# Patient Record
Sex: FEMALE | Race: WHITE | NOT HISPANIC OR LATINO | Employment: UNEMPLOYED | URBAN - METROPOLITAN AREA
[De-identification: names, ages, dates, MRNs, and addresses within clinical notes are randomized per-mention and may not be internally consistent; named-entity substitution may affect disease eponyms.]

---

## 2017-01-18 ENCOUNTER — CLINICAL SUPPORT (OUTPATIENT)
Dept: SMOKING CESSATION | Facility: CLINIC | Age: 52
End: 2017-01-18
Payer: COMMERCIAL

## 2017-01-18 DIAGNOSIS — F17.200 NICOTINE DEPENDENCE: Primary | ICD-10-CM

## 2017-01-18 PROCEDURE — 99407 BEHAV CHNG SMOKING > 10 MIN: CPT | Mod: S$GLB,,,

## 2017-08-03 ENCOUNTER — CLINICAL SUPPORT (OUTPATIENT)
Dept: SMOKING CESSATION | Facility: CLINIC | Age: 52
End: 2017-08-03
Payer: COMMERCIAL

## 2017-08-03 DIAGNOSIS — F17.200 NICOTINE DEPENDENCE: Primary | ICD-10-CM

## 2017-08-03 PROCEDURE — 99407 BEHAV CHNG SMOKING > 10 MIN: CPT | Mod: S$GLB,,, | Performed by: INTERNAL MEDICINE

## 2018-05-10 ENCOUNTER — OFFICE VISIT (OUTPATIENT)
Dept: OBSTETRICS AND GYNECOLOGY | Facility: CLINIC | Age: 53
End: 2018-05-10
Payer: OTHER GOVERNMENT

## 2018-05-10 VITALS
HEIGHT: 65 IN | WEIGHT: 200.19 LBS | DIASTOLIC BLOOD PRESSURE: 72 MMHG | BODY MASS INDEX: 33.35 KG/M2 | SYSTOLIC BLOOD PRESSURE: 110 MMHG

## 2018-05-10 DIAGNOSIS — N81.6 RECTOCELE: Primary | ICD-10-CM

## 2018-05-10 DIAGNOSIS — N81.10 BADEN-WALKER GRADE 1 CYSTOCELE: ICD-10-CM

## 2018-05-10 DIAGNOSIS — N39.3 SUI (STRESS URINARY INCONTINENCE, FEMALE): ICD-10-CM

## 2018-05-10 PROCEDURE — 99999 PR PBB SHADOW E&M-EST. PATIENT-LVL III: CPT | Mod: PBBFAC,,, | Performed by: OBSTETRICS & GYNECOLOGY

## 2018-05-10 PROCEDURE — 99213 OFFICE O/P EST LOW 20 MIN: CPT | Mod: PBBFAC | Performed by: OBSTETRICS & GYNECOLOGY

## 2018-05-10 PROCEDURE — 99213 OFFICE O/P EST LOW 20 MIN: CPT | Mod: S$PBB,,, | Performed by: OBSTETRICS & GYNECOLOGY

## 2018-05-10 NOTE — PROGRESS NOTES
Subjective:       Patient ID: Yeimi Watson is a 52 y.o. female.    Chief Complaint:  Pelvic Discomfort      History of Present Illness  HPI  Presents with rectal prolapse.  The patient has been seeing an outside urologist for difficulty voiding.  He examined her and did not appreciate a significant cystocele, but did note a rectocele.  He did a cystoscopy, and removed some bladder polyps, and sent her to pelvic floor PT for her urinary issues.  Patient has not noticed much of a difference.  She does complain of a bulge at the introitus, and states it is embarrassing for her and prevents her from having sex with her .  She does note occasional urinary leak with cough or sneeze.  Of note, the patient has had significant weight gain since she quit smoking 2 years ago, and is considering a gastric sleeve this summer to help her acheive weight loss.  She has a hx of a TVH for uterine prolapse.  Still has her ovaries.    GYN & OB History  No LMP recorded. Patient has had a hysterectomy.   Date of Last Pap: No result found    OB History    Para Term  AB Living   3 3           SAB TAB Ectopic Multiple Live Births                  # Outcome Date GA Lbr Tommy/2nd Weight Sex Delivery Anes PTL Lv   3 Para            2 Para            1 Para                   Review of Systems  Review of Systems   Constitutional: Negative for fatigue, fever and unexpected weight change.   Gastrointestinal: Positive for constipation (has chronic constipation mitigated by a daily OTC stool softener). Negative for abdominal pain, diarrhea, nausea and vomiting.   Genitourinary: Positive for urinary incontinence (see HPI). Negative for dyspareunia, dysuria, frequency, pelvic pain, urgency, vaginal bleeding, vaginal discharge, vaginal pain, postcoital bleeding and vaginal odor.           Objective:    Physical Exam:   Constitutional: She is oriented to person, place, and time. She appears well-developed and well-nourished. No  distress.             Abdominal: Soft. She exhibits no distension and no mass. There is no tenderness. There is no rebound and no guarding. Hernia confirmed negative in the right inguinal area and confirmed negative in the left inguinal area.     Genitourinary: Vagina normal. Pelvic exam was performed with patient supine. There is no rash, tenderness, lesion or injury on the right labia. There is no rash, tenderness, lesion or injury on the left labia. Uterus is absent. Right adnexum displays no mass, no tenderness and no fullness. Left adnexum displays no mass, no tenderness and no fullness. There is rectocele (grade 2 with Valsalva and perineal relaxation) and cystocele (mild grade 1 with Valsalva; no urinary leak on cough stress test) in the vagina. No erythema, tenderness, bleeding or unspecified prolapse of vaginal walls (Vaginal cuff does not descend at all.  It is very well-supported) in the vagina. No foreign body in the vagina. No signs of injury around the vagina. No vaginal discharge found. Cervix exhibits absence.               Neurological: She is alert and oriented to person, place, and time.     Psychiatric: She has a normal mood and affect.          Assessment:        1. Rectocele    2. Columbus-Walker grade 1 cystocele    3. ALTON (stress urinary incontinence, female)                Plan:      Yeimi was seen today for pelvic discomfort.    Diagnoses and all orders for this visit:    Rectocele    Columb[place]-Walker grade 1 cystocele    ALTON (stress urinary incontinence, female)    Reviewed pessary versus surgery.  Patient is interested in surgical management.  Has tried pelvic floor PT for her urinary issues with minimal improvement.  Since she is planning weight loss surgery this summer, I advised her to wait for pelvic floor repairs until she has achieved some weight loss.  Weight loss may help resolve her mild stress incontinence.  She will f/u with me 6 months following weight loss surgery, and we can  reassess her symptoms and plan appropriate surgical management.

## 2018-05-10 NOTE — PATIENT INSTRUCTIONS
Pelvic Organ Prolapse  Pelvic organ prolapse is when 1 or more of the organs inside the pelvis (found between the waist and thighs), slip from their normal positions. Normally, muscles and tissues in the pelvic region support the pelvic organs and hold them in place.  What is a normal pelvis?     Cutaway view of pelvis showing the small intesting, bladder, pubic bone, urethra, pelvic floor muscles, uterus, vagina, and rectum.   A. The small intestine absorbs nutrients from food.  B. The bladder collects and holds urine.  C. The pubic bone helps protect the pelvic organs.  D. The urethra is the tube that carries urine out of the body.  E. The pelvic floor muscles support organs and other structures in the pelvis.  F. The uterus is where the baby develops when a women is pregnant.  G. The vagina is the canal from the uterus to the outside of the body.  H. The rectum stores stool until a bowel movement occurs.  What causes pelvic organ prolapse?   There are several causes of pelvic organ prolapse including:  · Vaginal childbirth  · Genetic predisposition  · Connective tissue disorders  · Advancing age  · Constant coughing (such as with bronchitis or smoking)  · Heavy lifting  · Chronic straining (such as with constipation)  · Being overweight  What are the symptoms of pelvic organ prolapse?  The symptoms of pelvic organ prolapse include:  · A feeling of fullness or pressure in your pelvis  · A sense that a ball or lump is protruding from the vagina  · Problems passing urine or having a bowel movement  · Urine leakage when you cough or use stairs. (But this can happen even without prolapse.)   · Pain or pressure in your low back  · Problems with sexual intercourse  Date Last Reviewed: 5/10/2015  © 0300-9904 [employer]. [address] [place] [address], [place] [address]. All rights reserved. This information is not intended as a substitute for professional medical care. Always follow your healthcare  professional's instructions.        Pelvic Organ Prolapse: Surgery for Cystocele  Cystocele occurs when the bladder prolapses (sags) into the vagina. The goal of surgery is to repair the problem. This will help relieve your symptoms. Your surgery may include 1 or more repairs.     Cystocele         Anterior Repair         Incision made in the vaginal wall       Abdominal incisions      The surgical procedure  Cystocele can be treated with an anterior repair. This type of surgery is done through the vagina. The sagging bladder is moved back into its normal position. Sutures (stitches) are placed in tissue between the bladder and the vagina. This helps hold the bladder in place. In some cases, another type of surgery is done. It can help correct weakness in the front wall of the vagina. The vagina is attached to strong tissues in the side wall of the pelvis.  Your incisions  During surgery, the doctor will reach your pelvic organs through the vagina or the abdomen. An incision may be made in the vaginal wall. Surgery through the abdomen may be done with a single incision made up and down (vertically) or across (transverse), or through several small incisions (called laparoscopy).  Possible risks and complications of this surgery  · Infection  · Bleeding  · Risks of anesthesia  · Damage to nerves, muscles, or nearby pelvic structures  · Blood clots  · Prolapse of the pelvic organ or organs occurring again   Date Last Reviewed: 5/10/2015  © 1863-1397 [employer]. [address] [place] [address], [place] [address]. All rights reserved. This information is not intended as a substitute for professional medical care. Always follow your healthcare professional's instructions.        Pelvic Organ Prolapse: Surgery for Rectocele and Enterocele  The organs in the pelvis are supported by structures around them. Things like aging and childbirth can cause these structures to weaken. Loss of support lets pelvic organs fall out  of their normal position. This is called prolapse. If the rectum falls out of place and bulges into the vagina, it is called rectocele. If the small intestine falls out of place and bulges into the vagina, it is called enterocele. Surgery can be done to fix these problems. This will help relieve your symptoms.           Posterior repair         Incision made in vaginal wall       Abdominal incisions      The surgical procedure  · To correct a rectocele, the rectum is moved back to its normal position. The tissue between the vagina and rectum is sutured (stitched) to strengthen it. This stops the rectum from bulging into the vagina.  · To correct an enterocele, the small intestine is moved away from the vagina. Excess tissue is then sutured. This holds it in place.  Your incisions  During surgery, the doctor reaches your pelvic organs through the vagina or the abdomen. If going through the vagina, incisions may be made in the wall of the vagina. If the surgery is through the abdomen, several small incisions may be used to perform laparoscopic surgery, or one larger incision either up and down (vertical) or across (across) may be used.  Possible risks and complications of prolapse surgery  · Infection  · Bleeding  · Risks of anesthesia  · Damage to nerves, muscles, or nearby pelvic structures  · Blood clots  · Prolapse of the pelvic organ or organs occurring again   Date Last Reviewed: 5/10/2015  © 6163-5918 [employer]. [address] [place] [address], [place] [address]. All rights reserved. This information is not intended as a substitute for professional medical care. Always follow your healthcare professional's instructions.        Pelvic Organ Prolapse: Surgery for Incontinence  The pelvic organs include the organs of your reproductive system and your urinary tract. The pelvic organs are supported by pelvic floor muscles. Pelvic floor muscles can weaken. This may cause one or more pelvic organs to fall out  of place (prolapse). Pelvic organ prolapse can contribute to stress urinary incontinence (ALTON). This is trouble controlling the flow of urine out of the body. You may have surgery to treat ALTON. During this surgery, a prolapse can be fixed. One or more extra incisions may be needed to do this. Your surgeon can discuss the details with you.  Pelvic organ prolapse: common types     Cystocele.This is when the bladder sags into the vagina. To fix this, the bladder is moved back into its normal position. It is then sewn into place. The tissue between the vagina and bladder may be strengthened for better support to keep the bladder from sagging.     Uterine prolapse.This is when the uterus sags into the vagina. The uterus may fall as far as the opening of the vagina. To fix this, the uterus is often removed (hysterectomy). Or, the uterus may be sewn back into place.      Rectocele. This is when the rectum bulges into the vagina. An enterocele (much less common) is when the small intestine bulges into the vagina. During ALTON surgery, the bulge in the rectum or small intestine can be fixed.    Vaginal vault prolapse. This is when the walls of the vagina fall in on themselves. It can happen if the uterus has been removed. Surgery can be done to lift the vagina and hold it in place.   Risks and possible complications of this surgery  · Infection  · Bleeding  · Blood clots  · Risks of anesthesia  · Damage to nerves, muscles, or nearby pelvic structures  · Prolapse of the pelvic organ or organs happening again   Date Last Reviewed: 7/1/2016  © 6330-1565 [employer]. [address] [place] [address], [place] [address]. All rights reserved. This information is not intended as a substitute for professional medical care. Always follow your healthcare professional's instructions.

## 2018-05-29 ENCOUNTER — TELEPHONE (OUTPATIENT)
Dept: PULMONOLOGY | Facility: CLINIC | Age: 53
End: 2018-05-29

## 2018-05-29 DIAGNOSIS — J44.9 CHRONIC OBSTRUCTIVE PULMONARY DISEASE, UNSPECIFIED COPD TYPE: Primary | ICD-10-CM

## 2018-05-30 ENCOUNTER — PROCEDURE VISIT (OUTPATIENT)
Dept: PULMONOLOGY | Facility: CLINIC | Age: 53
End: 2018-05-30
Payer: OTHER GOVERNMENT

## 2018-05-30 DIAGNOSIS — J44.9 CHRONIC OBSTRUCTIVE PULMONARY DISEASE, UNSPECIFIED COPD TYPE: ICD-10-CM

## 2018-05-30 LAB
BRPFT: ABNORMAL
FEF 25 75 CHG: 13.6 %
FEF 25 75 LLN: 1.52
FEF 25 75 POST REF: 50.1 %
FEF 25 75 POST: 1.36 L/S (ref 1.52–3.91)
FEF 25 75 PRE REF: 44.1 %
FEF 25 75 PRE: 1.2 L/S (ref 1.52–3.91)
FEF 25 75 REF: 2.72
FET100 CHG: -8.4 %
FET100 POST: 12.66 SEC
FET100 PRE: 13.82 SEC
FEV1 CHG: 7.8 %
FEV1 FVC CHG: 3.3 %
FEV1 FVC LLN: 69
FEV1 FVC POST REF: 83.9 %
FEV1 FVC POST: 67.54 % (ref 69.28–91.63)
FEV1 FVC PRE REF: 81.3 %
FEV1 FVC PRE: 65.41 % (ref 69.28–91.63)
FEV1 FVC REF: 80
FEV1 LLN: 2.16
FEV1 POST REF: 87.6 %
FEV1 POST: 2.43 L (ref 2.16–3.38)
FEV1 PRE REF: 81.2 %
FEV1 PRE: 2.25 L (ref 2.16–3.38)
FEV1 REF: 2.77
FEV6 CHG: 6.1 %
FEV6 LLN: 2.78
FEV6 POST REF: 96.8 %
FEV6 POST: 3.35 L (ref 2.78–4.15)
FEV6 PRE REF: 91.3 %
FEV6 PRE: 3.16 L (ref 2.78–4.15)
FEV6 REF: 3.47
FVC CHG: 4.4 %
FVC LLN: 2.71
FVC POST REF: 103.8 %
FVC POST: 3.59 L (ref 2.71–4.22)
FVC PRE REF: 99.4 %
FVC PRE: 3.44 L (ref 2.71–4.22)
FVC REF: 3.46
MVV LLN: 88
MVV REF: 104
PEF CHG: 20.1 %
PEF LLN: 5.04
PEF POST REF: 93.3 %
PEF POST: 6.31 L/S (ref 5.04–8.48)
PEF PRE REF: 77.7 %
PEF PRE: 5.25 L/S (ref 5.04–8.48)
PEF REF: 6.76

## 2018-05-30 PROCEDURE — 94060 EVALUATION OF WHEEZING: CPT | Mod: 26,S$PBB,, | Performed by: INTERNAL MEDICINE

## 2018-05-30 PROCEDURE — 94060 EVALUATION OF WHEEZING: CPT | Mod: PBBFAC,PO

## 2018-05-31 ENCOUNTER — HOSPITAL ENCOUNTER (OUTPATIENT)
Dept: RADIOLOGY | Facility: HOSPITAL | Age: 53
Discharge: HOME | End: 2018-05-31
Attending: INTERNAL MEDICINE
Payer: OTHER GOVERNMENT

## 2018-05-31 ENCOUNTER — OFFICE VISIT (OUTPATIENT)
Dept: PULMONOLOGY | Facility: CLINIC | Age: 53
End: 2018-05-31
Payer: OTHER GOVERNMENT

## 2018-05-31 VITALS
WEIGHT: 195.31 LBS | SYSTOLIC BLOOD PRESSURE: 118 MMHG | BODY MASS INDEX: 32.54 KG/M2 | HEIGHT: 65 IN | RESPIRATION RATE: 18 BRPM | DIASTOLIC BLOOD PRESSURE: 76 MMHG | OXYGEN SATURATION: 98 % | HEART RATE: 80 BPM

## 2018-05-31 DIAGNOSIS — J44.9 CHRONIC OBSTRUCTIVE PULMONARY DISEASE, UNSPECIFIED COPD TYPE: ICD-10-CM

## 2018-05-31 DIAGNOSIS — F17.200 SMOKING: ICD-10-CM

## 2018-05-31 DIAGNOSIS — J30.89 SEASONAL ALLERGIC RHINITIS DUE TO OTHER ALLERGIC TRIGGER: ICD-10-CM

## 2018-05-31 DIAGNOSIS — J40 BRONCHITIS: ICD-10-CM

## 2018-05-31 DIAGNOSIS — J44.9 CHRONIC OBSTRUCTIVE PULMONARY DISEASE, UNSPECIFIED COPD TYPE: Primary | ICD-10-CM

## 2018-05-31 PROCEDURE — 99999 PR PBB SHADOW E&M-EST. PATIENT-LVL III: CPT | Mod: PBBFAC,,, | Performed by: INTERNAL MEDICINE

## 2018-05-31 PROCEDURE — 71046 X-RAY EXAM CHEST 2 VIEWS: CPT | Mod: 26,,, | Performed by: RADIOLOGY

## 2018-05-31 PROCEDURE — 99215 OFFICE O/P EST HI 40 MIN: CPT | Mod: 25,S$PBB,, | Performed by: INTERNAL MEDICINE

## 2018-05-31 PROCEDURE — 71046 X-RAY EXAM CHEST 2 VIEWS: CPT | Mod: TC,FY,PO

## 2018-05-31 PROCEDURE — 99213 OFFICE O/P EST LOW 20 MIN: CPT | Mod: PBBFAC,25,PO | Performed by: INTERNAL MEDICINE

## 2018-05-31 RX ORDER — FLUTICASONE PROPIONATE 50 MCG
2 SPRAY, SUSPENSION (ML) NASAL DAILY
Qty: 1 BOTTLE | Refills: 11 | Status: SHIPPED | OUTPATIENT
Start: 2018-05-31 | End: 2019-05-31

## 2018-05-31 RX ORDER — ALBUTEROL SULFATE 90 UG/1
2 AEROSOL, METERED RESPIRATORY (INHALATION) EVERY 6 HOURS
Qty: 1 INHALER | Refills: 12 | Status: SHIPPED | OUTPATIENT
Start: 2018-05-31 | End: 2019-05-31

## 2018-05-31 RX ORDER — FLUTICASONE PROPIONATE AND SALMETEROL XINAFOATE 115; 21 UG/1; UG/1
2 AEROSOL, METERED RESPIRATORY (INHALATION) 2 TIMES DAILY
Qty: 1 INHALER | Refills: 11 | Status: SHIPPED | OUTPATIENT
Start: 2018-05-31 | End: 2019-05-31

## 2018-05-31 RX ORDER — AZITHROMYCIN 250 MG/1
250 TABLET, FILM COATED ORAL DAILY
Qty: 6 TABLET | Refills: 1 | Status: SHIPPED | OUTPATIENT
Start: 2018-05-31 | End: 2018-12-06

## 2018-05-31 NOTE — PATIENT INSTRUCTIONS
Fluticasone; Salmeterol inhalation aerosol  What is this medicine?  FLUTICASONE; SALMETEROL (floo TIK a sone; sal ME te role) inhalation is a combination of two medicines that decrease inflammation and help to open up the airways of your lungs. It is used to treat asthma. Do NOT use for an acute asthma attack.  How should I use this medicine?  This medicine is inhaled through the mouth. Follow the directions on the prescription label. Shake well for 5 seconds before each use. After using the inhaler, rinse your mouth with water. Make sure not to swallow the water. Take your medicine at regular intervals. Do not take your medicine more often than directed. Do not stop taking except on your doctor's advice. Make sure that you are using your inhaler correctly. Ask you doctor or health care provider if you have any questions.  A special MedGuide will be given to you by the pharmacist with each prescription and refill. Be sure to read this information carefully each time.  Talk to your pediatrician regarding the use of this medicine in children. While this drug may be prescribed for children as young as 12 years of age for selected conditions, precautions do apply.  What side effects may I notice from receiving this medicine?  Side effects that you should report to your doctor or health care professional as soon as possible:  · allergic reactions like skin rash or hives, swelling of the face, lips, or tongue  · changes in vision  · chest pain  · feeling faint or lightheaded, falls  · fever or chills  · irregular heartbeat  Side effects that usually do not require medical attention (report to your doctor or health care professional if they continue or are bothersome):  · coughing, hoarseness, throat irritation  · headache  · nervousness  · stomach problems  · stuffy nose  · tremors  What may interact with this medicine?  Do not take this medicine with any of the following medications:  · MAOIs like Carbex, Eldepryl,  Marplan, Nardil, and Parnate  This medicine may also interact with the following medications:  · aminophylline or theophylline  · antiviral medicines for HIV or AIDS  · diuretics  · medicines for colds  · medicines for depression or emotional conditions  · medicines for fungal infections like ketoconazole and itraconazole  · medicines for the heart like metoprolol, propanolol  · medicines for weight loss including some herbal products  · other medicine for breathing problems  · pimozide  · some antibiotics like clarithromycin, erythromycin, levofloxacin, linezolid, and telithromycin  · vaccines  What if I miss a dose?  If you miss a dose, use it as soon as you remember. If it is almost time for your next dose, use only that dose and continue with your regular schedule, spacing doses evenly. Do not use double or extra doses.  Where should I keep my medicine?  Keep out of the reach of children.  Store at room temperature between 15 and 30 degrees C (59 and 86 degrees F). Store inhaler with the mouthpiece down. Keep track of the number of doses used. Throw away the inhaler after 120 inhalations or after the expiration date, whichever comes first.  What should I tell my health care provider before I take this medicine?  They need to know if you have any of these conditions:  · bone problems  · immune system problems  · diabetes  · heart disease or irregular heartbeat  · high blood pressure  · infection  · pheochromocytoma  · seizures  · thyroid disease  · worsening asthma  · an unusual or allergic reaction to fluticasone; salmeterol, other corticosteroids, other medicines, foods, dyes, or preservatives  · pregnant or trying to get pregnant  · breast-feeding  What should I watch for while using this medicine?  Visit your doctor for regular check ups. Tell your doctor or health care professional if your symptoms do not get better. If your symptoms get worse or if you need your short-acting inhalers more often, call your  doctor right away. Do not use this medicine more than every 12 hours.  If you have asthma, be aware that using this medicine may increase your risk of dying from asthma-related problems. Talk to your doctor about the risks and benefits of taking this medicine. NEVER use this medicine for an acute asthma attack.  This medicine may increase your risk of getting an infection. Tell your doctor or health care professional if you are around anyone with measles or chickenpox, or if you develop sores or blisters that do not heal properly.  NOTE:This sheet is a summary. It may not cover all possible information. If you have questions about this medicine, talk to your doctor, pharmacist, or health care provider. Copyright© 2017 [employer]        Albuterol inhalation aerosol  What is this medicine?  ALBUTEROL (al BYOO ter ole) is a bronchodilator. It helps open up the airways in your lungs to make it easier to breathe. This medicine is used to treat and to prevent bronchospasm.  How should I use this medicine?  This medicine is for inhalation through the mouth. Follow the directions on your prescription label. Take your medicine at regular intervals. Do not use more often than directed. Make sure that you are using your inhaler correctly. Ask you doctor or health care provider if you have any questions.  Talk to your pediatrician regarding the use of this medicine in children. Special care may be needed.  What side effects may I notice from receiving this medicine?  Side effects that you should report to your doctor or health care professional as soon as possible:  · allergic reactions like skin rash, itching or hives, swelling of the face, lips, or tongue  · breathing problems  · chest pain  · feeling faint or lightheaded, falls  · high blood pressure  · irregular heartbeat  · fever  · muscle cramps or weakness  · pain, tingling, numbness in the hands or feet  · vomiting  Side effects that usually do not require medical  attention (report to your doctor or health care professional if they continue or are bothersome):  · cough  · difficulty sleeping  · headache  · nervousness or trembling  · stomach upset  · stuffy or runny nose  · throat irritation  · unusual taste  What may interact with this medicine?  · anti-infectives like chloroquine and pentamidine  · caffeine  · cisapride  · diuretics  · medicines for colds  · medicines for depression or for emotional or psychotic conditions  · medicines for weight loss including some herbal products  · methadone  · some antibiotics like clarithromycin, erythromycin, levofloxacin, and linezolid  · some heart medicines  · steroid hormones like dexamethasone, cortisone, hydrocortisone  · theophylline  · thyroid hormones  What if I miss a dose?  If you miss a dose, use it as soon as you can. If it is almost time for your next dose, use only that dose. Do not use double or extra doses.  Where should I keep my medicine?  Keep out of the reach of children.  Store at room temperature between 15 and 30 degrees C (59 and 86 degrees F). The contents are under pressure and may burst when exposed to heat or flame. Do not freeze. This medicine does not work as well if it is too cold. Throw away any unused medicine after the expiration date. Inhalers need to be thrown away after the labeled number of puffs have been used or by the expiration date; whichever comes first. Ventolin HFA should be thrown away 12 months after removing from foil pouch. Check the instructions that come with your medicine.  What should I tell my health care provider before I take this medicine?  They need to know if you have any of the following conditions:  · diabetes  · heart disease or irregular heartbeat  · high blood pressure  · pheochromocytoma  · seizures  · thyroid disease  · an unusual or allergic reaction to albuterol, levalbuterol, sulfites, other medicines, foods, dyes, or preservatives  · pregnant or trying to get  "pregnant  · breast-feeding  What should I watch for while using this medicine?  Tell your doctor or health care professional if your symptoms do not improve. Do not use extra albuterol. If your asthma or bronchitis gets worse while you are using this medicine, call your doctor right away.  If your mouth gets dry try chewing sugarless gum or sucking hard candy. Drink water as directed.  NOTE:This sheet is a summary. It may not cover all possible information. If you have questions about this medicine, talk to your doctor, pharmacist, or health care provider. Copyright© 2017 [employer]        Using an Inhaler  Your healthcare provider may prescribe medicine that you breathe in using a metered-dose inhaler (MDI). An inhaler sends a measured amount of medicine in a fine mist.  Step 1:  · Shake the inhaler and remove the cap.  · Take a deep breath and let it out.  Step 2:  · Close your lips around the end of the inhaler mouthpiece. Or if you were told to use the "open-mouth" method, hold the inhaler 1 to 2 inches from your mouth.  Step 3:  · Breathe in slowly and deeply as you press down on the inhaler to release the medicine.  · Inhale fully.  Step 4:  · Hold your breath for a count of 10, or as long as you can comfortably.  · Then breathe out slowly through your mouth.  · Repeat these steps for each puff of medicine prescribed.             Important  · If the inhaler is being used for the first time or has not been used for a while, prime it as directed by the product maker. You can find important information about the medicine in the package insert. This is the paper that comes with the medicine.  · If you use more than one inhaler, make sure you know which one to use first.  · Your healthcare provider or pharmacist can show you how to use your inhaler the right way. Even if you think you are using it the right way, it is still a good idea to check.   Date Last Reviewed: 10/1/2016  © 1795-9669 [employer], " [employer]. [address] [place] [address]. All rights reserved. This information is not intended as a substitute for professional medical care. Always follow your healthcare professional's instructions.        Step-by-Step  Using an Inhaler with a Spacer    Date Last Reviewed: 2/1/2017  © 2146-1569 [employer]. [address] [place] [address]. All rights reserved. This information is not intended as a substitute for professional medical care. Always follow your healthcare professional's instructions.        Fluticasone nasal spray  What is this medicine?  FLUTICASONE (floo TIK a sone) is a corticosteroid. This medicine is used to treat the symptoms of allergies like sneezing, itchy red eyes, and itchy, runny, or stuffy nose.  How should I use this medicine?  This medicine is for use in the nose. Follow the directions on your product or prescription label. This medicine works best if used at regular intervals. Do not use more often than directed. Make sure that you are using your nasal spray correctly. After 6 months of daily use without a prescription, talk to your doctor or health care professional before using it for a longer time. Ask your doctor or health care professional if you have any questions.  Talk to your pediatrician regarding the use of this medicine in children. Special care may be needed. This medicine has been used in children as young as 2 years. After two months of daily use without a prescription in a child, talk to your pediatrician before using it for a longer time.  What side effects may I notice from receiving this medicine?  Side effects that you should report to your doctor or health care professional as soon as possible:  · allergic reactions like skin rash, itching or hives, swelling of the face, lips, or tongue  · changes in vision  · flu-like symptoms  · white patches or sores in the mouth or nose  Side effects that usually do not require medical attention  (report to your doctor or health care professional if they continue or are bothersome):  · burning or irritation inside the nose or throat  · cough  · headache  · nosebleed  · unusual taste or smell  What may interact with this medicine?  · ketoconazole  · metyrapone  · some medicines for HIV  · vaccines  What if I miss a dose?  If you miss a dose, use it as soon as you remember. If it is almost time for your next dose, use only that dose and continue with your regular schedule. Do not use double or extra doses.  Where should I keep my medicine?  Keep out of the reach of children.  Store at room temperature between 15 and 30 degrees C (59 and 86 degrees F). Throw away any unused medicine after the expiration date.  What should I tell my health care provider before I take this medicine?  They need to know if you have any of these conditions:  · infection, like tuberculosis, herpes, or fungal infection  · recent surgery on nose or sinuses  · taking corticosteroid by mouth  · an unusual or allergic reaction to fluticasone, steroids, other medicines, foods, dyes, or preservatives  · pregnant or trying to get pregnant  · breast-feeding  What should I watch for while using this medicine?  Visit your doctor or health care professional for regular checks on your progress. Some symptoms may improve within 12 hours after starting use. Check with your doctor or health care professional if there is no improvement in your condition after 3 weeks of use.  Do not come in contact with people who have chickenpox or the measles while you are taking this medicine. If you do, call your doctor right away.  NOTE:This sheet is a summary. It may not cover all possible information. If you have questions about this medicine, talk to your doctor, pharmacist, or health care provider. Copyright© 2017 [employer]        Azithromycin tablets  What is this medicine?  AZITHROMYCIN (az ith ashwini MYE sin) is a macrolide antibiotic. It is used to  treat or prevent certain kinds of bacterial infections. It will not work for colds, flu, or other viral infections.  How should I use this medicine?  Take this medicine by mouth with a full glass of water. Follow the directions on the prescription label. The tablets can be taken with food or on an empty stomach. If the medicine upsets your stomach, take it with food. Take your medicine at regular intervals. Do not take your medicine more often than directed. Take all of your medicine as directed even if you think your are better. Do not skip doses or stop your medicine early. Talk to your pediatrician regarding the use of this medicine in children. Special care may be needed.  What side effects may I notice from receiving this medicine?  Side effects that you should report to your doctor or health care professional as soon as possible:  · allergic reactions like skin rash, itching or hives, swelling of the face, lips, or tongue  · confusion, nightmares or hallucinations  · dark urine  · difficulty breathing  · hearing loss  · irregular heartbeat or chest pain  · pain or difficulty passing urine  · redness, blistering, peeling or loosening of the skin, including inside the mouth  · white patches or sores in the mouth  · yellowing of the eyes or skin  Side effects that usually do not require medical attention (report to your doctor or health care professional if they continue or are bothersome):  · diarrhea  · dizziness, drowsiness  · headache  · stomach upset or vomiting  · tooth discoloration  · vaginal irritation  What may interact with this medicine?  Do not take this medicine with any of the following medications:  · lincomycin  This medicine may also interact with the following medications:  · amiodarone  · antacids  · birth control pills  · cyclosporine  · digoxin  · magnesium  · nelfinavir  · phenytoin  · warfarin  What if I miss a dose?  If you miss a dose, take it as soon as you can. If it is almost time for  your next dose, take only that dose. Do not take double or extra doses.  Where should I keep my medicine?  Keep out of the reach of children.  Store at room temperature between 15 and 30 degrees C (59 and 86 degrees F). Throw away any unused medicine after the expiration date.  What should I tell my health care provider before I take this medicine?  They need to know if you have any of these conditions:  · kidney disease  · liver disease  · irregular heartbeat or heart disease  · an unusual or allergic reaction to azithromycin, erythromycin, other macrolide antibiotics, foods, dyes, or preservatives  · pregnant or trying to get pregnant  · breast-feeding  What should I watch for while using this medicine?  Tell your doctor or health care professional if your symptoms do not improve.  Do not treat diarrhea with over the counter products. Contact your doctor if you have diarrhea that lasts more than 2 days or if it is severe and watery.  This medicine can make you more sensitive to the sun. Keep out of the sun. If you cannot avoid being in the sun, wear protective clothing and use sunscreen. Do not use sun lamps or tanning beds/booths.  NOTE:This sheet is a summary. It may not cover all possible information. If you have questions about this medicine, talk to your doctor, pharmacist, or health care provider. Copyright© 2017 [employer]

## 2018-05-31 NOTE — PROGRESS NOTES
Subjective:       Patient ID: Yeimi Watson is a 52 y.o. female.    Chief Complaint: She       COPD    HPI     COPD  She presents for evaluation and treatment of COPD. The patient is currently having symptoms / an exacerbation. Current symptoms include chronic dyspnea, dyspnea after 3 flights of stairs and non-productive cough. Symptoms have been present since several years ago and have been gradually worsening. She denies dyspnea, wheezing and fatigue. Associated symptoms include poor exercise tolerance.  This episode appears to have been triggered by heat. Treatments tried for the current exacerbation: none. The patient has been having similar episodes for approximately 3 years. She uses 1 pillows at night. Patient currently is not on home oxygen therapy.. The patient is having no constitutional symptoms, denying fever, chills, anorexia, or weight loss. The patient has not been hospitalized for this condition before. She quit smoking approximately 2 years ago. The patient is experiencing exercise intolerance (difficulty climbing 3 flights of stairs).      Past Medical History:   Diagnosis Date    Chest pain syndrome 3/11/2016    Emphysema     early     Past Surgical History:   Procedure Laterality Date    BREAST LUMPECTOMY Left     left breast, benign    HYSTERECTOMY      TVH for prolapse     Social History     Social History    Marital status:      Spouse name: N/A    Number of children: N/A    Years of education: N/A     Occupational History    Not on file.     Social History Main Topics    Smoking status: Former Smoker     Packs/day: 1.00     Years: 32.00     Quit date: 4/28/2016    Smokeless tobacco: Not on file    Alcohol use No    Drug use: No    Sexual activity: Yes     Other Topics Concern    Not on file     Social History Narrative    No narrative on file     Review of Systems   Constitutional: Positive for fatigue. Negative for fever.   HENT: Positive for postnasal drip,  rhinorrhea and congestion.    Eyes: Negative for redness and itching.   Respiratory: Positive for cough, sputum production, shortness of breath, dyspnea on extertion, use of rescue inhaler and Paroxysmal Nocturnal Dyspnea.    Cardiovascular: Negative for chest pain, palpitations and leg swelling.   Genitourinary: Negative for difficulty urinating and hematuria.   Endocrine: Negative for cold intolerance and heat intolerance.    Skin: Negative for rash.   Gastrointestinal: Negative for nausea and abdominal pain.   Neurological: Negative for dizziness, syncope, weakness and light-headedness.   Hematological: Negative for adenopathy. Does not bruise/bleed easily.   Psychiatric/Behavioral: Negative for sleep disturbance. The patient is not nervous/anxious.        Objective:      Physical Exam   Constitutional: She is oriented to person, place, and time. She appears well-developed and well-nourished.   HENT:   Head: Normocephalic and atraumatic.   Mouth/Throat: Oropharyngeal exudate present.   Eyes: Conjunctivae are normal. Pupils are equal, round, and reactive to light.   Neck: Neck supple. No JVD present. No tracheal deviation present. No thyromegaly present.   Cardiovascular: Normal rate, regular rhythm and normal heart sounds.    Pulmonary/Chest: Effort normal. No respiratory distress. She has decreased breath sounds. She has wheezes in the right lower field and the left lower field. She has no rhonchi. She has no rales. She exhibits no tenderness.   Abdominal: Soft. Bowel sounds are normal.   Musculoskeletal: Normal range of motion. She exhibits no edema.   Lymphadenopathy:     She has no cervical adenopathy.   Neurological: She is alert and oriented to person, place, and time.   Skin: Skin is warm and dry.   Nursing note and vitals reviewed.    Personal Diagnostic Review  Chest x-ray: hyperinflation  Radiology Result      Name:   :  Patient MRN:   Yeimi Watson [id number]   Account Number: Room & Bed  Accession Number:   [id number]   [id number]   Authorizing Physician: Patient Class: Diagnosis:   Jose David Curran OP- Outpatient Diagnostic Testing Chronic obstructive pulmonary disease, unspecified COPD type [J44.9 (ICD-10-CM)]   Procedure:  Exam Date: Reason for Exam:   X-Ray Chest PA And Lateral 05/31/2018 None Specified             RESULTS:  EXAMINATION:  XR CHEST PA AND LATERAL     CLINICAL HISTORY:  Chronic obstructive pulmonary disease, unspecified     TECHNIQUE:  PA and lateral views of the chest were performed.     COMPARISON:  March 8, 2016.     FINDINGS:  Heart size is normal.  The lungs are hyperexpanded.  No confluent infiltrate or effusion.  No acute osseous abnormality.     IMPRESSION:      As above.        Electronically signed by: CHOCO Warren MD  Date:                                            05/31/2018  Time:                                           09:27                      Office Spirometry Results: normal today     No flowsheet data found.  Pulmonary Studies Review 5/31/2018   SpO2 98   Height 65.000   Weight 3125.24   BMI (Calculated) 32.6   Predicted Distance 383.08   Predicted Distance Meters (Calculated) 523.47         Assessment:       1. Chronic obstructive pulmonary disease, unspecified COPD type    2. Seasonal allergic rhinitis due to other allergic trigger    3. Bronchitis        Outpatient Encounter Prescriptions as of 5/31/2018   Medication Sig Dispense Refill    albuterol 90 mcg/actuation inhaler Inhale 2 puffs into the lungs every 6 (six) hours. 1 Inhaler 12    azithromycin (ZITHROMAX Z-SOTERO) 250 MG tablet Take 1 tablet (250 mg total) by mouth once daily. 500 mg on day 1 (two tablets) followed by 250 mg once daily on days 2-5 6 tablet 1    fluticasone (FLONASE) 50 mcg/actuation nasal spray 2 sprays (100 mcg total) by Each Nare route once daily. 1 Bottle 11    fluticasone-salmeterol (ADVAIR HFA) 115-21 mcg/actuation HFAA Inhale 2 puffs into the lungs 2 (two) times daily.  Controller 1 Inhaler 11    predniSONE (DELTASONE) 20 MG tablet Prednisone 60 mg/ day for 3 days, 40 mg/day for 3 days,20 mg/ day for 3 days, (1/2 tablet )10 mg a day for 3 days. 20 tablet 1     No facility-administered encounter medications on file as of 2018.      Orders Placed This Encounter   Procedures    Six Minute Walk Test to qualify for Home Oxygen     Standing Status:   Future     Standing Expiration Date:   2019     Plan:       Requested Prescriptions     Signed Prescriptions Disp Refills    fluticasone (FLONASE) 50 mcg/actuation nasal spray 1 Bottle 11     Si sprays (100 mcg total) by Each Nare route once daily.    albuterol 90 mcg/actuation inhaler 1 Inhaler 12     Sig: Inhale 2 puffs into the lungs every 6 (six) hours.    fluticasone-salmeterol (ADVAIR HFA) 115-21 mcg/actuation HFAA 1 Inhaler 11     Sig: Inhale 2 puffs into the lungs 2 (two) times daily. Controller    azithromycin (ZITHROMAX Z-SOTERO) 250 MG tablet 6 tablet 1     Sig: Take 1 tablet (250 mg total) by mouth once daily. 500 mg on day 1 (two tablets) followed by 250 mg once daily on days 2-5     Chronic obstructive pulmonary disease, unspecified COPD type  -     albuterol 90 mcg/actuation inhaler; Inhale 2 puffs into the lungs every 6 (six) hours.  Dispense: 1 Inhaler; Refill: 12  -     fluticasone-salmeterol (ADVAIR HFA) 115-21 mcg/actuation HFAA; Inhale 2 puffs into the lungs 2 (two) times daily. Controller  Dispense: 1 Inhaler; Refill: 11  -     Six Minute Walk Test to qualify for Home Oxygen; Future    Seasonal allergic rhinitis due to other allergic trigger  -     fluticasone (FLONASE) 50 mcg/actuation nasal spray; 2 sprays (100 mcg total) by Each Nare route once daily.  Dispense: 1 Bottle; Refill: 11    Bronchitis  -     azithromycin (ZITHROMAX Z-SOTERO) 250 MG tablet; Take 1 tablet (250 mg total) by mouth once daily. 500 mg on day 1 (two tablets) followed by 250 mg once daily on days 2-5  Dispense: 6 tablet; Refill:  1           Follow-up in about 6 weeks (around 7/12/2018) for 6 min walk.    MEDICAL DECISION MAKING: Moderate to high complexity.  Overall, the multiple problems listed are of moderate to high severity that may impact quality of life and activities of daily living. Side effects of medications, treatment plan as well as options and alternatives reviewed and discussed with patient. There was counseling of patient concerning these issues.    Total time spent in face to face counseling and coordination of care - 40  minutes over 50% of time was used in discussion of prognosis, risks, benefits of treatment, instructions and compliance with regimen . Discussion with other physicians or health care providers (DME, NP, pharmacy, respiratory therapy) occurred.

## 2018-07-12 ENCOUNTER — OFFICE VISIT (OUTPATIENT)
Dept: PULMONOLOGY | Facility: CLINIC | Age: 53
End: 2018-07-12
Payer: OTHER GOVERNMENT

## 2018-07-12 ENCOUNTER — PROCEDURE VISIT (OUTPATIENT)
Dept: PULMONOLOGY | Facility: CLINIC | Age: 53
End: 2018-07-12
Payer: OTHER GOVERNMENT

## 2018-07-12 ENCOUNTER — HOSPITAL ENCOUNTER (OUTPATIENT)
Dept: RADIOLOGY | Facility: HOSPITAL | Age: 53
Discharge: HOME | End: 2018-07-12
Attending: INTERNAL MEDICINE
Payer: OTHER GOVERNMENT

## 2018-07-12 ENCOUNTER — PATIENT MESSAGE (OUTPATIENT)
Dept: PULMONOLOGY | Facility: CLINIC | Age: 53
End: 2018-07-12

## 2018-07-12 VITALS — HEIGHT: 65 IN | BODY MASS INDEX: 32.49 KG/M2 | WEIGHT: 195 LBS

## 2018-07-12 VITALS
OXYGEN SATURATION: 98 % | RESPIRATION RATE: 18 BRPM | HEART RATE: 68 BPM | BODY MASS INDEX: 32.49 KG/M2 | HEIGHT: 65 IN | WEIGHT: 195 LBS | SYSTOLIC BLOOD PRESSURE: 126 MMHG | DIASTOLIC BLOOD PRESSURE: 82 MMHG

## 2018-07-12 DIAGNOSIS — J44.9 CHRONIC OBSTRUCTIVE PULMONARY DISEASE, UNSPECIFIED COPD TYPE: ICD-10-CM

## 2018-07-12 DIAGNOSIS — J30.1 NON-SEASONAL ALLERGIC RHINITIS DUE TO POLLEN: Primary | ICD-10-CM

## 2018-07-12 DIAGNOSIS — J30.1 NON-SEASONAL ALLERGIC RHINITIS DUE TO POLLEN: ICD-10-CM

## 2018-07-12 PROCEDURE — 99213 OFFICE O/P EST LOW 20 MIN: CPT | Mod: PBBFAC,25,PO | Performed by: INTERNAL MEDICINE

## 2018-07-12 PROCEDURE — 99214 OFFICE O/P EST MOD 30 MIN: CPT | Mod: 25,S$PBB,, | Performed by: INTERNAL MEDICINE

## 2018-07-12 PROCEDURE — 70220 X-RAY EXAM OF SINUSES: CPT | Mod: 26,,, | Performed by: RADIOLOGY

## 2018-07-12 PROCEDURE — 94618 PULMONARY STRESS TESTING: CPT | Mod: 26,S$PBB,, | Performed by: INTERNAL MEDICINE

## 2018-07-12 PROCEDURE — 99999 PR PBB SHADOW E&M-EST. PATIENT-LVL III: CPT | Mod: PBBFAC,,, | Performed by: INTERNAL MEDICINE

## 2018-07-12 PROCEDURE — 70220 X-RAY EXAM OF SINUSES: CPT | Mod: TC,FY,PO

## 2018-07-12 PROCEDURE — 94618 PULMONARY STRESS TESTING: CPT | Mod: PBBFAC,PO

## 2018-07-12 RX ORDER — GUAIFENESIN 600 MG/1
1200 TABLET, EXTENDED RELEASE ORAL 2 TIMES DAILY
Qty: 60 TABLET | COMMUNITY
Start: 2018-07-12 | End: 2018-07-22

## 2018-07-12 RX ORDER — METHYLPREDNISOLONE 4 MG/1
TABLET ORAL
Qty: 1 PACKAGE | Refills: 1 | Status: SHIPPED | OUTPATIENT
Start: 2018-07-12 | End: 2018-08-02

## 2018-07-12 RX ORDER — MOXIFLOXACIN HYDROCHLORIDE 400 MG/1
400 TABLET ORAL DAILY
Qty: 10 TABLET | Refills: 0 | Status: SHIPPED | OUTPATIENT
Start: 2018-07-12 | End: 2018-12-17

## 2018-07-12 NOTE — PROGRESS NOTES
Subjective:       Patient ID: Yeimi Watson is a 52 y.o. female.    Chief Complaint: She       COPD    HPI     Sinus congestion  Something in throat - unable to clear  Started 4 months ago - persistent    COPD  She presents for evaluation and treatment of COPD. The patient is not currently have symptoms / an exacerbation. The patient has COPD for approximately 2 years. Symptoms in previous episodes have included dyspnea, cough, wheezing and fatigue, and typically last 2 weeks. Previous episodes have been exacerbated by moderate activity and strenuous activity. Current treatment includes albuterol inhaler, which generally provides some relief of symptoms.   She uses 1 pillows at night. Patient currently is not on home oxygen therapy.. The patient is having no constitutional symptoms, denying fever, chills, anorexia, or weight loss. The patient has not been hospitalized for this condition before. She has a history of 32 pack years. The patient is experiencing exercise intolerance (difficulty climbing 3 flights of stairs).      Past Medical History:   Diagnosis Date    Chest pain syndrome 3/11/2016    Emphysema     early     Past Surgical History:   Procedure Laterality Date    BREAST LUMPECTOMY Left     left breast, benign    HYSTERECTOMY      TVH for prolapse     Social History     Social History    Marital status:      Spouse name: N/A    Number of children: N/A    Years of education: N/A     Occupational History    Not on file.     Social History Main Topics    Smoking status: Former Smoker     Packs/day: 1.00     Years: 32.00     Quit date: 4/28/2016    Smokeless tobacco: Not on file    Alcohol use No    Drug use: No    Sexual activity: Yes     Other Topics Concern    Not on file     Social History Narrative    No narrative on file     Review of Systems   Constitutional: Negative for fever and fatigue.   HENT: Positive for postnasal drip and rhinorrhea.    Eyes: Negative for redness and  itching.   Respiratory: Negative for cough, shortness of breath, wheezing, dyspnea on extertion and Paroxysmal Nocturnal Dyspnea.    Cardiovascular: Negative for chest pain.   Genitourinary: Negative for difficulty urinating and hematuria.   Endocrine: Negative for polyphagia, cold intolerance and heat intolerance.    Musculoskeletal: Negative for arthralgias.   Skin: Negative for rash.   Gastrointestinal: Negative for nausea, vomiting, abdominal pain and abdominal distention.   Neurological: Negative for dizziness and headaches.   Hematological: Negative for adenopathy. Does not bruise/bleed easily and no excessive bruising.   Psychiatric/Behavioral: The patient is not nervous/anxious.        Objective:      Physical Exam   Constitutional: She is oriented to person, place, and time. She appears well-developed and well-nourished.   HENT:   Head: Normocephalic and atraumatic.   Mouth/Throat: Oropharyngeal exudate present.   Eyes: Conjunctivae are normal. Pupils are equal, round, and reactive to light.   Neck: Neck supple. No JVD present. No tracheal deviation present. No thyromegaly present.   Cardiovascular: Normal rate, regular rhythm and normal heart sounds.    Pulmonary/Chest: Effort normal. No respiratory distress. She has decreased breath sounds. She has no wheezes. She has no rhonchi. She has no rales. She exhibits no tenderness.   Abdominal: Soft. Bowel sounds are normal.   Musculoskeletal: Normal range of motion. She exhibits no edema.   Lymphadenopathy:     She has no cervical adenopathy.   Neurological: She is alert and oriented to person, place, and time.   Skin: Skin is warm and dry.   Nursing note and vitals reviewed.    Personal Diagnostic Review  Chest x-ray: hyperinflation  Sinsus x rays - unremarkable  Spirometry: mild obstruction  Office Spirometry Results:     No flowsheet data found.  Pulmonary Studies Review 7/12/2018   SpO2 -   Ordering Provider Dr Curran   Interpreting Provider Dr Curran    Performing nurse/tech/RT CHOCO Kaminski RRT   Diagnosis COPD   Height 65   Weight 3119.99   BMI (Calculated) 32.5   Predicted Distance 383.7   Patient Race    6MWT Status completed without stopping   Patient Reported No complaints   Was O2 used? No   Did patient stop? No   Type of assistive device(s) used? no assistive devices   Is extra documentation required for this patient? Yes   Oxygen Saturation 98   Supplemental Oxygen Room Air   Heart Rate 56   Blood Pressure 109/65   Anna Dyspnea Rating  light   Oxygen Saturation 97   Supplemental Oxygen Room Air   Heart Rate 120   Blood Pressure 115/65   Anna Dyspnea Rating  somewhat heavy   Recovery Time (seconds) 240   Oxygen Saturation 98   Supplemental Oxygen Room Air   Heart Rate 70   Blood Pressure 101/61   Anna Dyspnea Rating  very light   Is procedure ready for interpretation? Yes   Did the patient stop or pause? No   Total Time Walked (Calculated) 360   Total Laps Walked 7   Final Partial Lap Distance (feet) 100   Total Distance Feet (Calculated) 1500   Total Distance Meters (Calculated) 457.2   Predicted Distance Meters (Calculated) 523.81   Percentage of Predicted (Calculated) 87.28   Peak VO2 (Calculated) 17.7   Mets 5.06   Has The Patient Had a Previous Six Minute Walk Test? No   Oxygen Qualification? No         Assessment:       1. Non-seasonal allergic rhinitis due to pollen    2. Chronic obstructive pulmonary disease, unspecified COPD type        Outpatient Encounter Prescriptions as of 7/12/2018   Medication Sig Dispense Refill    albuterol 90 mcg/actuation inhaler Inhale 2 puffs into the lungs every 6 (six) hours. 1 Inhaler 12    fluticasone (FLONASE) 50 mcg/actuation nasal spray 2 sprays (100 mcg total) by Each Nare route once daily. 1 Bottle 11    fluticasone-salmeterol (ADVAIR HFA) 115-21 mcg/actuation HFAA Inhale 2 puffs into the lungs 2 (two) times daily. Controller 1 Inhaler 11    azithromycin (ZITHROMAX Z-SOTERO) 250 MG tablet Take 1 tablet  (250 mg total) by mouth once daily. 500 mg on day 1 (two tablets) followed by 250 mg once daily on days 2-5 6 tablet 1    guaiFENesin (MUCINEX) 600 mg 12 hr tablet Take 2 tablets (1,200 mg total) by mouth 2 (two) times daily. for 10 days 60 tablet prn    methylPREDNISolone (MEDROL DOSEPACK) 4 mg tablet use as directed 1 Package 1    moxifloxacin (AVELOX) 400 mg tablet Take 1 tablet (400 mg total) by mouth once daily. 10 tablet 0    predniSONE (DELTASONE) 20 MG tablet Prednisone 60 mg/ day for 3 days, 40 mg/day for 3 days,20 mg/ day for 3 days, (1/2 tablet )10 mg a day for 3 days. 20 tablet 1     No facility-administered encounter medications on file as of 7/12/2018.      Orders Placed This Encounter   Procedures    X-Ray Sinuses Min 3 Views     Standing Status:   Future     Number of Occurrences:   1     Standing Expiration Date:   7/13/2019     Order Specific Question:   Reason for Exam:     Answer:   exclude sinusitis     Order Specific Question:   Is the patient pregnant?     Answer:   No    Spirometry with/without bronchodilator     Standing Status:   Future     Standing Expiration Date:   7/12/2019     Plan:       Requested Prescriptions     Signed Prescriptions Disp Refills    guaiFENesin (MUCINEX) 600 mg 12 hr tablet 60 tablet prn     Sig: Take 2 tablets (1,200 mg total) by mouth 2 (two) times daily. for 10 days    methylPREDNISolone (MEDROL DOSEPACK) 4 mg tablet 1 Package 1     Sig: use as directed    moxifloxacin (AVELOX) 400 mg tablet 10 tablet 0     Sig: Take 1 tablet (400 mg total) by mouth once daily.     Non-seasonal allergic rhinitis due to pollen  -     guaiFENesin (MUCINEX) 600 mg 12 hr tablet; Take 2 tablets (1,200 mg total) by mouth 2 (two) times daily. for 10 days  Dispense: 60 tablet; Refill: prn  -     X-Ray Sinuses Min 3 Views; Future; Expected date: 07/12/2018  -     methylPREDNISolone (MEDROL DOSEPACK) 4 mg tablet; use as directed  Dispense: 1 Package; Refill: 1  -     moxifloxacin  (AVELOX) 400 mg tablet; Take 1 tablet (400 mg total) by mouth once daily.  Dispense: 10 tablet; Refill: 0    Chronic obstructive pulmonary disease, unspecified COPD type  -     Spirometry with/without bronchodilator; Future; Expected date: 01/12/2019           Follow-up in about 6 months (around 1/12/2019) for Review maksim.    MEDICAL DECISION MAKING: Moderate to high complexity.  Overall, the multiple problems listed are of moderate to high severity that may impact quality of life and activities of daily living. Side effects of medications, treatment plan as well as options and alternatives reviewed and discussed with patient. There was counseling of patient concerning these issues.    Total time spent in face to face counseling and coordination of care - 25  minutes over 50% of time was used in discussion of prognosis, risks, benefits of treatment, instructions and compliance with regimen . Discussion with other physicians or health care providers (DME, NP, pharmacy, respiratory therapy) occurred.

## 2018-07-12 NOTE — PATIENT INSTRUCTIONS
Methylprednisolone tablets  What is this medicine?  METHYLPREDNISOLONE (meth ill pred NISS oh lone) is a corticosteroid. It is commonly used to treat inflammation of the skin, joints, lungs, and other organs. Common conditions treated include asthma, allergies, and arthritis. It is also used for other conditions, such as blood disorders and diseases of the adrenal glands.  How should I use this medicine?  Take this medicine by mouth with a drink of water. Follow the directions on the prescription label. Take it with food or milk to avoid stomach upset. If you are taking this medicine once a day, take it in the morning. Do not take more medicine than you are told to take. Do not suddenly stop taking your medicine because you may develop a severe reaction. Your doctor will tell you how much medicine to take. If your doctor wants you to stop the medicine, the dose may be slowly lowered over time to avoid any side effects.  Talk to your pediatrician regarding the use of this medicine in children. Special care may be needed.  What side effects may I notice from receiving this medicine?  Side effects that you should report to your doctor or health care professional as soon as possible:  · allergic reactions like skin rash, itching or hives, swelling of the face, lips, or tongue  · eye pain, decreased or blurred vision, or bulging eyes  · fever, sore throat, sneezing, cough, or other signs of infection, wounds that will not heal  · increased thirst  · mental depression, mood swings, mistaken feelings of self importance or of being mistreated  · pain in hips, back, ribs, arms, shoulders, or legs  · swelling of the ankles, feet, hands  · trouble passing urine or change in the amount of urine  Side effects that usually do not require medical attention (report to your doctor or health care professional if they continue or are bothersome):  · confusion, excitement, restlessness  · headache  · nausea, vomiting  · skin  problems, acne, thin and shiny skin  · weight gain  What may interact with this medicine?  Do not take this medicine with any of the following medications:  · mifepristone  This medicine may also interact with the following medications:  · tacrolimus  · vaccines  · warfarin  What if I miss a dose?  If you miss a dose, take it as soon as you can. If it is almost time for your next dose, talk to your doctor or health care professional. You may need to miss a dose or take an extra dose. Do not take double or extra doses without advice.  Where should I keep my medicine?  Keep out of the reach of children.  Store at room temperature between 20 and 25 degrees C (68 and 77 degrees F). Throw away any unused medicine after the expiration date.  What should I tell my health care provider before I take this medicine?  They need to know if you have any of these conditions:  · Cushing's syndrome  · diabetes  · glaucoma  · heart problems or disease  · high blood pressure  · infection such as herpes, measles, tuberculosis, or chickenpox  · kidney disease  · liver disease  · mental problems  · myasthenia gravis  · osteoporosis  · seizures  · stomach ulcer or intestine disease including colitis and diverticulitis  · thyroid problem  · an unusual or allergic reaction to lactose, methylprednisolone, other medicines, foods, dyes, or preservatives  · pregnant or trying to get pregnant  · breast-feeding  What should I watch for while using this medicine?  Visit your doctor or health care professional for regular checks on your progress. If you are taking this medicine for a long time, carry an identification card with your name and address, the type and dose of your medicine, and your doctor's name and address.  The medicine may increase your risk of getting an infection. Stay away from people who are sick. Tell your doctor or health care professional if you are around anyone with measles or chickenpox.  If you are going to have surgery,  tell your doctor or health care professional that you have taken this medicine within the last twelve months.  Ask your doctor or health care professional about your diet. You may need to lower the amount of salt you eat.  The medicine can increase your blood sugar. If you are a diabetic check with your doctor if you need help adjusting the dose of your diabetic medicine.  NOTE:This sheet is a summary. It may not cover all possible information. If you have questions about this medicine, talk to your doctor, pharmacist, or health care provider. Copyright© 2017 [employer]        Moxifloxacin tablets  What is this medicine?  MOXIFLOXACIN (mox i FLOX a sin) is a quinolone antibiotic. It is used to treat certain kinds of bacterial infections. It will not work for colds, flu, or other viral infections.  How should I use this medicine?  Take this medicine by mouth with a glass of water. It can be taken with or without food. Follow the directions on the prescription label. Take your medicine at regular intervals. Do not take your medicine more often than directed. Take all of your medicine as directed even if you think your are better. Do not skip doses or stop your medicine early.  A special MedGuide will be given to you by the pharmacist with each prescription and refill. Be sure to read this information carefully each time.  Talk to your pediatrician regarding the use of this medicine in children. Special care may be needed.  What side effects may I notice from receiving this medicine?  Side effects that you should report to your doctor or health care professional as soon as possible:  · allergic reactions like skin rash or hives, swelling of the face, lips, or tongue  · anxious  · confusion  · depressed mood  · diarrhea  · fast, irregular heartbeat  · hallucination, loss of contact with reality  · joint, muscle, or tendon pain or swelling  · pain, tingling, numbness in the hands or feet  · suicidal thoughts or other  mood changes  · sunburn  · unusually weak or tired  Side effects that usually do not require medical attention (report to your doctor or health care professional if they continue or are bothersome):  · dry mouth  · headache  · nausea  · trouble sleeping  What may interact with this medicine?  Do not take this medicine with any of the following medications:  · arsenic trioxide  · chloroquine  · cisapride  · droperidol  · halofantrine  · pentamidine  · phenothiazines like chlorpromazine, mesoridazine, prochlorperazine, thioridazine  · pimozide  · some medications for irregular heart rhythm like amiodarone, disopyramide, dofetilide, flecainide, ibutilide, quinidine, procainamide, sotalol  · ziprasidone  This medicine may also interact with the following medications:  · antacids  · birth control pills  · didanosine (ddI) buffered tablets or powder  · erythromycin  · medicines for inflammation like ibuprofen, naproxen  · vitamins with iron or zinc  · medicines for depression, anxiety, or psychotic disturbances  · sucralfate  · warfarin  What if I miss a dose?  If you miss a dose, take it as soon as you can. If it is almost time for your next dose, take only that dose. Do not take double or extra doses.  Where should I keep my medicine?  Keep out of the reach of children.  Store at room temperature between 15 to 30 degrees C (59 to 86 degrees F). Do not store in a humid place. Throw away any unused medicine after the expiration date.  What should I tell my health care provider before I take this medicine?  They need to know if you have any of these conditions:  · bone problems  · cerebral disease  · joint problems  · history of low levels of potassium in the blood  · irregular heartbeat  · kidney disease  · myasthenia gravis  · seizures  · tendon problems  · tingling of the fingers or toes, or other nerve disorder  · an unusual or allergic reaction to moxifloxacin, other quinolone antibiotics, other medicines, foods,  dyes, or preservatives  · pregnant or try to get pregnant  · breast-feeding  What should I watch for while using this medicine?  Tell your doctor or health care professional if your symptoms do not improve.  Do not treat diarrhea with over the counter products. Contact your doctor if you have diarrhea that lasts more than 2 days or if it is severe and watery.  If you have diabetes, monitor your blood glucose carefully while on this medicine.  You may get drowsy or dizzy. Do not drive, use machinery, or do anything that needs mental alertness until you know how this medicine affects you. Do not stand or sit up quickly, especially if you are an older patient. This reduces the risk of dizzy or fainting spells.  This medicine can make you more sensitive to the sun. Keep out of the sun. If you cannot avoid being in the sun, wear protective clothing and use sunscreen. Do not use sun lamps or tanning beds/booths.  Avoid antacids, aluminum, calcium, iron, magnesium, and zinc products for 4 hours before and 8 hours after taking a dose of this medicine.  NOTE:This sheet is a summary. It may not cover all possible information. If you have questions about this medicine, talk to your doctor, pharmacist, or health care provider. Copyright© 2017 [employer]      Please read Warning before using.    USE ONLY AS DIRECTED, IF SYMPTOMS PERSIST SEE YOUR DOCTOR/HEALTHCARE PROFESSIONAL. ALWAYS READ THE LABEL. IMPORTANT: NeilMed® SINUS RINSE Mixture Packets should be used with NeilMed® 240 mL (8 fl oz) NASAFLO® to achieve the best results. You may use NeilMed® packets with other irrigation devices, as long as you mix with the correct volume of water. Our recommendation is to replace Neti Pot every three months.  Step 1  Please wash your hands and rinse the device. Fill the NASAFLO® with 240 mL (8 fl oz) of lukewarm distilled, filtered or previously boiled water. Please do not use tap or faucet water to dissolve the mixture unless it  has been previously boiled and cooled down. You may warm the water in a microwave, but we recommend that you warm it in increments of 5 to 10 seconds to avoid overheating, damaging the device or scalding your nasal passage. Step 2  Cut the SINUS RINSE mixture packet at the corner and pour contents into the pot. Tighten the lid on the device securely. Place one finger over the hole of the cap and shake the device gently to dissolve the mixture. Step 3  Standing in front of a sink, bend forward to your comfort level and tilt your head to one side. Keeping your mouth open, and without holding your breath, apply the tip of the device snugly against your nasal passage and ALLOW THE SOLUTION TO GENTLY FLOW until the solution starts draining from the opposite nasal passage. Use roughly half the solution in the NASAFLO® (120 mL / 4 fl oz).  It should not enter your mouth unless you are tilting your head backwards. To adjust or stop the flow, you may place your finger over the hole of the cap, and depending on the seal, you may be able to control the flow. Step 4  Blow your nose very gently, without pinching nose completely to avoid pressure on eardrums. If tolerable, sniff in gently any residual solution remaining in the nasal passage once or twice, because this may clean out the posterior nasopharyngeal area, which is the area at the back of your nasal passage. At times, some solution will reach the back of your throat, so please spit it out.  For NASAFLO® users, to help drain any residual solution, blow your nose gently while tilting your head forward and to the same side of the nasal passage you just rinsed. Step 5  Now repeat steps 3 & 4 on your other nasal passage.  If there is any solution left over, please discard it. We recommend you make a fresh solution each time you rinse. Rinse once or twice daily OR as directed by your physician. Saline is considered safe.  The U.[place] FDA is recommending that common cough and  cold over the counter medicines not be given to babies and toddlers, and that antihistamines should not be given to children under age 6. NeilMed® NASAFLO®: Please clean with soap & water and let air dry Please read Warning before using.    Our recommendation is to replace Neti Pot every three months.

## 2018-07-12 NOTE — PROCEDURES
"[facility]  Six Minute Walk     SUMMARY     Ordering Provider: Dr Curran   Interpreting Provider: Dr Curran  Performing nurse/tech/RT: CHOCO Kaminski RRT  Diagnosis: COPD  Height: 5' 5" (165.1 cm)  Weight: 88.5 kg (195 lb)  BMI (Calculated): 32.5   Patient Race:             Phase Oxygen Assessment Supplemental O2 Heart   Rate Blood Pressure Anna Dyspnea Scale Rating   Resting 98 % Room Air 56 bpm 109/65 2   Exercise        Minute        1 98 % Room Air 105 bpm     2 98 % Room Air 104 bpm     3 98 % Room Air 100 bpm     4 98 % Room Air 105 bpm     5 98 % Room Air 111 bpm     6  97 % Room Air 120 bpm 115/65 4   Recovery        Minute        1 98 % Room Air 74 bpm     2 98 % Room Air 73 bpm     3 98 % Room Air 76 bpm     4 98 % Room Air 70 bpm 101/61 1     Six Minute Walk Summary  6MWT Status: completed without stopping  Patient Reported: No complaints     Interpretation:  Did the patient stop or pause?: No                                         Total Time Walked (Calculated): 360 seconds  Final Partial Lap Distance (feet): 100 feet  Total Distance Meters (Calculated): 457.2 meters  Predicted Distance Meters (Calculated): 523.81 meters  Percentage of Predicted (Calculated): 87.28  Peak VO2 (Calculated): 17.7  Mets: 5.06  Has The Patient Had a Previous Six Minute Walk Test?: No       Previous 6MWT Results  Has The Patient Had a Previous Six Minute Walk Test?: No    Interpretation:  Total distance walked in six minutes is normal indicating normal functional capacity. There was no significant oxygen desaturation. Clinical correlation suggested.    Jose David Curran MD    "

## 2018-08-09 ENCOUNTER — PATIENT MESSAGE (OUTPATIENT)
Dept: PULMONOLOGY | Facility: CLINIC | Age: 53
End: 2018-08-09

## 2018-12-06 ENCOUNTER — OFFICE VISIT (OUTPATIENT)
Dept: OBSTETRICS AND GYNECOLOGY | Facility: CLINIC | Age: 53
End: 2018-12-06
Payer: OTHER GOVERNMENT

## 2018-12-06 VITALS
SYSTOLIC BLOOD PRESSURE: 126 MMHG | WEIGHT: 162.06 LBS | DIASTOLIC BLOOD PRESSURE: 78 MMHG | HEIGHT: 65 IN | BODY MASS INDEX: 27 KG/M2

## 2018-12-06 DIAGNOSIS — K64.4 EXTERNAL HEMORRHOIDS: Primary | ICD-10-CM

## 2018-12-06 DIAGNOSIS — Z12.39 BREAST CANCER SCREENING: ICD-10-CM

## 2018-12-06 DIAGNOSIS — N81.6 RECTOCELE: ICD-10-CM

## 2018-12-06 PROBLEM — N39.3 SUI (STRESS URINARY INCONTINENCE, FEMALE): Status: RESOLVED | Noted: 2018-05-10 | Resolved: 2018-12-06

## 2018-12-06 PROBLEM — N81.10 BADEN-WALKER GRADE 1 CYSTOCELE: Status: RESOLVED | Noted: 2018-05-10 | Resolved: 2018-12-06

## 2018-12-06 PROCEDURE — 99999 PR PBB SHADOW E&M-EST. PATIENT-LVL III: CPT | Mod: PBBFAC,,, | Performed by: OBSTETRICS & GYNECOLOGY

## 2018-12-06 PROCEDURE — 99213 OFFICE O/P EST LOW 20 MIN: CPT | Mod: S$PBB,,, | Performed by: OBSTETRICS & GYNECOLOGY

## 2018-12-06 PROCEDURE — 99213 OFFICE O/P EST LOW 20 MIN: CPT | Mod: PBBFAC | Performed by: OBSTETRICS & GYNECOLOGY

## 2018-12-06 NOTE — PROGRESS NOTES
Subjective:       Patient ID: Yeimi Watson is a 53 y.o. female.    Chief Complaint:  Vaginal Prolapse      History of Present Illness  HPI  Patient presents to follow-up prolapse.  Saw me in May complaining of a bulge at the vaginal introitus.  Also had occasional stress urinary incontinence with cough or sneeze.  On exam, she had a Grade 1 cystocele with Grade 2 rectocele, and a negative cough stress test.  At that time, she was planning a sleeve gastrectomy, and our plan was to see how her prolapse and ALTON symptoms were doing after her weight loss.  She underwent sleeve gastrectomy, and has lost about 40 lbs since her last visit.  She no longer notices a bulge at the vaginal introitus, and her stress urinary incontinence has resolved.  She currently has issues with constipation related to her bariatric surgery diet, and feels like she now has hemorrhoids that are bothersome.  Wants to make sure that what she is feeling at the rectum are hemorrhoids and not vaginal prolapse.  Has hx of TVH for prolapse.    GYN & OB History  No LMP recorded. Patient has had a hysterectomy.   Date of Last Pap: No result found    OB History    Para Term  AB Living   3 3           SAB TAB Ectopic Multiple Live Births                  # Outcome Date GA Lbr Tommy/2nd Weight Sex Delivery Anes PTL Lv   3 Para            2 Para            1 Para                   Review of Systems  Review of Systems   Constitutional: Negative for fatigue, fever and unexpected weight change.   Gastrointestinal: Positive for constipation. Negative for abdominal pain, blood in stool, diarrhea, nausea, vomiting and fecal incontinence.   Genitourinary: Negative for bladder incontinence, dyspareunia, dysuria, frequency, pelvic pain, urgency, vaginal bleeding, vaginal discharge, vaginal pain, urinary incontinence, postcoital bleeding and vaginal odor.           Objective:    Physical Exam:   Constitutional: She is oriented to person, place, and  time. She appears well-developed and well-nourished. No distress.             Abdominal: Soft. She exhibits no distension and no mass. There is no tenderness. There is no rebound and no guarding. Hernia confirmed negative in the right inguinal area and confirmed negative in the left inguinal area.     Genitourinary: Vagina normal. Rectal exam shows external hemorrhoid. Pelvic exam was performed with patient supine. There is no rash, tenderness, lesion or injury on the right labia. There is no rash, tenderness, lesion or injury on the left labia. Uterus is absent. Right adnexum displays no mass, no tenderness and no fullness. Left adnexum displays no mass, no tenderness and no fullness. There is rectocele (milde, grade 1 rectocele; improved since prior exam) in the vagina. No erythema, tenderness, bleeding, cystocele (no cystocele noted on today's exam) or unspecified prolapse of vaginal walls (vaginal cuff is well-supported and not descending) in the vagina. No foreign body in the vagina. No signs of injury around the vagina. No vaginal discharge found. Vaginal cuff normal.Cervix exhibits absence.   Genitourinary Comments: Patient was examined with a full bladder.  Negative cough stress test today               Neurological: She is alert and oriented to person, place, and time.     Psychiatric: She has a normal mood and affect.          Assessment:        1. External hemorrhoids    2. Breast cancer screening    3. Rectocele                Plan:      Yeimi was seen today for vaginal prolapse.    Diagnoses and all orders for this visit:    External hemorrhoids  -     Ambulatory Referral to General Surgery    Breast cancer screening  -     Mammo Digital Screening Bilat; Future    Rectocele    Patient's ALTON completely resolved following weight loss, and her prolapse has actually improved with weight loss.  I do not think she needs surgical management of prolapse at this time.  Will refer to surgery for bothersome  hemorrhoids.  Due for MMG.  RTC 1 year or sooner prn.

## 2018-12-17 ENCOUNTER — OFFICE VISIT (OUTPATIENT)
Dept: SURGERY | Facility: CLINIC | Age: 53
End: 2018-12-17
Payer: OTHER GOVERNMENT

## 2018-12-17 VITALS
SYSTOLIC BLOOD PRESSURE: 91 MMHG | HEART RATE: 70 BPM | WEIGHT: 154.31 LBS | BODY MASS INDEX: 25.68 KG/M2 | DIASTOLIC BLOOD PRESSURE: 65 MMHG | TEMPERATURE: 98 F

## 2018-12-17 DIAGNOSIS — K64.4 EXTERNAL HEMORRHOIDS: ICD-10-CM

## 2018-12-17 DIAGNOSIS — K62.5 ANAL BLEEDING: Primary | ICD-10-CM

## 2018-12-17 PROCEDURE — 46600 DIAGNOSTIC ANOSCOPY SPX: CPT | Mod: S$PBB,,, | Performed by: COLON & RECTAL SURGERY

## 2018-12-17 PROCEDURE — 46600 DIAGNOSTIC ANOSCOPY SPX: CPT | Mod: PBBFAC | Performed by: COLON & RECTAL SURGERY

## 2018-12-17 PROCEDURE — 99244 OFF/OP CNSLTJ NEW/EST MOD 40: CPT | Mod: 25,S$PBB,, | Performed by: COLON & RECTAL SURGERY

## 2018-12-17 PROCEDURE — 99999 PR PBB SHADOW E&M-EST. PATIENT-LVL III: CPT | Mod: PBBFAC,,, | Performed by: COLON & RECTAL SURGERY

## 2018-12-17 PROCEDURE — 99213 OFFICE O/P EST LOW 20 MIN: CPT | Mod: PBBFAC,25 | Performed by: COLON & RECTAL SURGERY

## 2018-12-17 NOTE — LETTER
December 17, 2018      Ginette Guillen MD  [address] [place] [address]           [facility]  [id number] [place], [place] [address]on [facility] [address]  Phone: [phone]  Fax: [phone]          Patient: Yeimi Watson   MR Number: [id number]   YOB: 1965   Date of Visit: 12/17/2018       Dear Dr. Ginette Guillen:    Thank you for referring Yeimi Watson to me for evaluation. Attached you will find relevant portions of my assessment and plan of care.    If you have questions, please do not hesitate to call me. I look forward to following Yeimi Watson along with you.    Sincerely,    Orville Stephens MD    Enclosure  CC:  No Recipients    If you would like to receive this communication electronically, please contact [email or web address] or [phone] to request more information on [facility] access.    For providers and/or their staff who would like to refer a patient to [facility], please contact us through our one-stop-shop provider referral line, [facility] , at [phone].    If you feel you have received this communication in error or would no longer like to receive these types of communications, please e-mail [email or web address]

## 2018-12-17 NOTE — PROGRESS NOTES
History & Physical    SUBJECTIVE:     Chief Complaint   Patient presents with    Consult     hemorrhoids       History of Present Illness:  Patient is a 53 y.o. female presents for evaluation of hemorrhoids.  Patient reports a multiyear history of constipation with a known hemorrhoids that she is treated over-the-counter in the past.  Reports that she underwent a gastric sleeve earlier this year and that she had worsening of the constipation and hemorrhoids symptoms since that time.  She reports that she has flares of her hemorrhoids about 2 times per month with pain in the perianal area and swelling. She does report that she has intermittently seen blood when wiping and about 3 weeks ago notice blood in her stool in the toilet.  She has required manual disimpaction and assistance with bowel movements before.  She reports that her last colonoscopy was around 8 years ago the did show some benign polyps removed and was recommended to have another colonoscopy in 5 years but has not had that since that time.  She currently denies any anal or rectal pain, bleeding with her bowel movement that occurred this morning, melena, fever, chills, diarrhea.  She does take Colace 1-2 times every day as well as Dulcolax as needed.  She drinks around 64 oz of water per day but does not take fiber supplementation.  She does reports straining with bowel movements they usually takes X to 15 min per bowel movement.  She on average has 1 bowel movement per week.  No personal family history of colorectal cancer or IBD.      Review of patient's allergies indicates:  No Known Allergies    Current Outpatient Medications   Medication Sig Dispense Refill    albuterol 90 mcg/actuation inhaler Inhale 2 puffs into the lungs every 6 (six) hours. 1 Inhaler 12    fluticasone (FLONASE) 50 mcg/actuation nasal spray 2 sprays (100 mcg total) by Each Nare route once daily. 1 Bottle 11    fluticasone-salmeterol (ADVAIR HFA) 115-21 mcg/actuation HFAA  Inhale 2 puffs into the lungs 2 (two) times daily. Controller 1 Inhaler 11     No current facility-administered medications for this visit.        Past Medical History:   Diagnosis Date    Chest pain syndrome 3/11/2016    Emphysema     early     Past Surgical History:   Procedure Laterality Date    BREAST LUMPECTOMY Left     left breast, benign    GASTRIC BYPASS      HYSTERECTOMY      TVH for prolapse     Family History   Problem Relation Age of Onset    Heart disease Father     Heart attack Father 47        Fatal    Heart attack Sister 52        Catheterization with stent placement    Breast cancer Neg Hx     Colon cancer Neg Hx     Ovarian cancer Neg Hx     Thrombophilia Neg Hx     Birth defects Neg Hx      Social History     Tobacco Use    Smoking status: Former Smoker     Packs/day: 1.00     Years: 32.00     Pack years: 32.00     Last attempt to quit: 2016     Years since quittin.6   Substance Use Topics    Alcohol use: No    Drug use: No        Review of Systems:  Review of Systems   Constitutional: Negative for activity change, appetite change, chills, fatigue, fever and unexpected weight change.   HENT: Negative for congestion, ear pain, sore throat and trouble swallowing.    Eyes: Negative for pain, redness and itching.   Respiratory: Negative for cough, shortness of breath and wheezing.    Cardiovascular: Negative for chest pain, palpitations and leg swelling.   Gastrointestinal: Positive for anal bleeding, blood in stool and constipation. Negative for abdominal distention, abdominal pain, diarrhea, nausea, rectal pain and vomiting.   Endocrine: Negative for cold intolerance, heat intolerance and polyuria.   Genitourinary: Negative for dysuria, flank pain, frequency and hematuria.   Musculoskeletal: Negative for gait problem, joint swelling and neck pain.   Skin: Negative for color change, rash and wound.   Allergic/Immunologic: Negative for environmental allergies and  immunocompromised state.   Neurological: Negative for dizziness, speech difficulty, weakness and numbness.   Psychiatric/Behavioral: Negative for agitation, confusion and hallucinations.       OBJECTIVE:     Vital Signs (Most Recent)  Temp: 97.5 °F (36.4 °C) (12/17/18 1303)  Pulse: 70 (12/17/18 1303)  BP: 91/65 (12/17/18 1303)     70 kg (154 lb 5.2 oz)     Physical Exam:  Physical Exam   Constitutional: She is oriented to person, place, and time. She appears well-developed.   HENT:   Head: Normocephalic and atraumatic.   Eyes: Conjunctivae and EOM are normal.   Neck: Normal range of motion. No thyromegaly present.   Cardiovascular: Normal rate and regular rhythm.   Pulmonary/Chest: Effort normal. No respiratory distress.   Abdominal: Soft. She exhibits no distension and no mass. There is no tenderness.   Genitourinary:   Genitourinary Comments: Anorectal: +external hemorrhoids, most pronounced in the left anterior and right anterior locations; EDSON with no palpable masses or lesions, good tone, no blood; +rectocele   Musculoskeletal: Normal range of motion. She exhibits no edema or tenderness.   Neurological: She is alert and oriented to person, place, and time.   Skin: Skin is warm and dry. Capillary refill takes less than 2 seconds. No rash noted.   Psychiatric: She has a normal mood and affect.     Anoscopy Procedure Note    Pre-procedure diagnosis: Hemorrhoids    Post-procedure diagnosis: External hemorrhoids    Procedure: Anoscopy    Surgeon: Orville Stephens MD    Assistant: Bonnie Anguiano MA    Specimen: none    Findings:  External exam revealed external hemorrhoids in the left anterior and right anterior positions that are soft, non thrombosed and nontender.  Anoscope inserted in all 4 quadrants examined. Minimally enlarged left anterior and right anterior internal hemorrhoidal columns without evidence of bleeding and nonfriable.  No mucosal changes or masses appreciated.  Limited visualization by stool in the  vault.    Patient tolerated procedure well.      Laboratory  Lab Results   Component Value Date    WBC 6.80 03/08/2016    HGB 12.2 03/08/2016    HCT 37.3 03/08/2016     03/08/2016    ALT 15 03/08/2016    AST 31 03/08/2016     03/08/2016    K 5.3 (H) 03/08/2016     03/08/2016    CREATININE 0.8 03/08/2016    BUN 16 03/08/2016    CO2 25 03/08/2016    INR 1.0 03/08/2016         ASSESSMENT/PLAN:     53-year-old female with chronic constipation and external hemorrhoids with recently increased flares and hematochezia    - patient will require colonoscopy as she has not had one in 8 years, previously had polyps removed and has new onset increasing hematochezia.  Will schedule for 1st available  - encourage patient to take 100 mg of Colace 2-3 times per day scheduled, continue water intake a 64 oz per day, start fiber supplementation with Benefiber or Metamucil daily, start MiraLax or generic version 1-2 times per day to achieve bowel movements daily, no longer strain when having bowel movements since been less than 5 min on the toilet per bowel movement.  - RTC in 6 weeks to discuss results of colonoscopy and to see if lifestyle and behavioral modifications improved symptomatology.  If they have not by this point, we will decide on surgical intervention versus referral to Gastroenterology for further treatment of constipation as this is likely the underlying cause of her hemorrhoidal flares    Anal bleeding  -     Case request GI: COLONOSCOPY    External hemorrhoids  -     Case request GI: COLONOSCOPY      Follow-up in about 6 weeks (around 1/28/2019).    Orville Stephens MD  Colon and Rectal Surgery  [facility]

## 2018-12-19 ENCOUNTER — TELEPHONE (OUTPATIENT)
Dept: ENDOSCOPY | Facility: HOSPITAL | Age: 53
End: 2018-12-19

## 2018-12-19 RX ORDER — SODIUM, POTASSIUM,MAG SULFATES 17.5-3.13G
SOLUTION, RECONSTITUTED, ORAL ORAL
Qty: 1 KIT | Refills: 0 | Status: ON HOLD | OUTPATIENT
Start: 2018-12-19 | End: 2019-02-15 | Stop reason: HOSPADM

## 2018-12-19 NOTE — TELEPHONE ENCOUNTER
Endoscopy Scheduling Questionnaire:    Call Type:Incoming call via [employer]    1. Have you been admitted overnight to the hospital in the past 3 months? no  2. Do you get CP and SOB while walking up a flight of stairs? no  3. Have you had a stent placed in the past 12 months? no  4. Have you had a stroke or heart attack in the past 6 months? no  5. Have you had any chest pain in the past 3 months? no      If so, have you been evaluated by your PCP or Cardiologist? no  6. Do you take weight loss medications? no  7. Have you been diagnosed with Diverticulitis within the past 3 months? no  8. Are you having any GI symptoms that you feel need to be evaluated prior to your procedure? no  9. Are you on dialysis? no  10. Are you diabetic? no  11. Do you have any other health issues that you feel might limit your ability to safely have the procedure and/or sedation? no  12. Is the patient over 79 yo? no        If so, has the patient been seen by their PCP or GI in the last 3 months? N/A       -I have reviewed the last colonoscopy for recommendations regarding surveillance and bowel prep  is NA  -I have reviewed the patient's medications and allergies. She is not on high risk medications and will require cardiac clearance. A clearance request NA  -I have verified the pharmacy information. The prep being used is Suprep. The patient's prep instructions were sent via mail..    Date Endoscopy Scheduled: Date: 2-15-19  Or  Date Gastro office visit Scheduled: NA

## 2018-12-26 ENCOUNTER — HOSPITAL ENCOUNTER (OUTPATIENT)
Dept: RADIOLOGY | Facility: HOSPITAL | Age: 53
Discharge: HOME | End: 2018-12-26
Attending: OBSTETRICS & GYNECOLOGY
Payer: OTHER GOVERNMENT

## 2018-12-26 VITALS — WEIGHT: 154 LBS | HEIGHT: 65 IN | BODY MASS INDEX: 25.66 KG/M2

## 2018-12-26 DIAGNOSIS — Z12.39 BREAST CANCER SCREENING: ICD-10-CM

## 2018-12-26 PROCEDURE — 77067 SCR MAMMO BI INCL CAD: CPT | Mod: 26,,, | Performed by: RADIOLOGY

## 2018-12-26 PROCEDURE — 77067 SCR MAMMO BI INCL CAD: CPT | Mod: TC

## 2019-01-02 ENCOUNTER — TELEPHONE (OUTPATIENT)
Dept: ENDOSCOPY | Facility: HOSPITAL | Age: 54
End: 2019-01-02

## 2019-01-04 ENCOUNTER — HOSPITAL ENCOUNTER (OUTPATIENT)
Dept: RADIOLOGY | Facility: HOSPITAL | Age: 54
Discharge: HOME | End: 2019-01-04
Attending: OBSTETRICS & GYNECOLOGY
Payer: OTHER GOVERNMENT

## 2019-01-04 DIAGNOSIS — R92.8 ABNORMAL MAMMOGRAM: ICD-10-CM

## 2019-01-04 PROCEDURE — 77061 MAMMO DIGITAL DIAGNOSTIC LEFT WITH TOMOSYNTHESIS_CAD: ICD-10-PCS | Mod: 26,LT,, | Performed by: RADIOLOGY

## 2019-01-04 PROCEDURE — 77065 DX MAMMO INCL CAD UNI: CPT | Mod: TC,PO,LT

## 2019-01-04 PROCEDURE — 77065 DX MAMMO INCL CAD UNI: CPT | Mod: 26,LT,, | Performed by: RADIOLOGY

## 2019-01-04 PROCEDURE — 77065 MAMMO DIGITAL DIAGNOSTIC LEFT WITH TOMOSYNTHESIS_CAD: ICD-10-PCS | Mod: 26,LT,, | Performed by: RADIOLOGY

## 2019-01-04 PROCEDURE — 77061 BREAST TOMOSYNTHESIS UNI: CPT | Mod: 26,LT,, | Performed by: RADIOLOGY

## 2019-01-04 PROCEDURE — 77061 BREAST TOMOSYNTHESIS UNI: CPT | Mod: TC,PO,LT

## 2019-02-15 ENCOUNTER — ANESTHESIA EVENT (OUTPATIENT)
Dept: ENDOSCOPY | Facility: HOSPITAL | Age: 54
End: 2019-02-15
Payer: OTHER GOVERNMENT

## 2019-02-15 ENCOUNTER — ANESTHESIA (OUTPATIENT)
Dept: ENDOSCOPY | Facility: HOSPITAL | Age: 54
End: 2019-02-15
Payer: OTHER GOVERNMENT

## 2019-02-15 ENCOUNTER — HOSPITAL ENCOUNTER (OUTPATIENT)
Facility: HOSPITAL | Age: 54
Discharge: HOME | End: 2019-02-15
Attending: COLON & RECTAL SURGERY | Admitting: COLON & RECTAL SURGERY
Payer: OTHER GOVERNMENT

## 2019-02-15 VITALS
HEART RATE: 60 BPM | TEMPERATURE: 98 F | BODY MASS INDEX: 24.07 KG/M2 | DIASTOLIC BLOOD PRESSURE: 59 MMHG | SYSTOLIC BLOOD PRESSURE: 88 MMHG | RESPIRATION RATE: 18 BRPM | OXYGEN SATURATION: 100 % | WEIGHT: 141 LBS | HEIGHT: 64 IN

## 2019-02-15 DIAGNOSIS — Z12.11 SCREENING FOR COLON CANCER: ICD-10-CM

## 2019-02-15 PROCEDURE — 45380 PR COLONOSCOPY,BIOPSY: ICD-10-PCS | Mod: 59,,, | Performed by: COLON & RECTAL SURGERY

## 2019-02-15 PROCEDURE — 45380 COLONOSCOPY AND BIOPSY: CPT | Performed by: COLON & RECTAL SURGERY

## 2019-02-15 PROCEDURE — 37000009 HC ANESTHESIA EA ADD 15 MINS: Performed by: COLON & RECTAL SURGERY

## 2019-02-15 PROCEDURE — 37000008 HC ANESTHESIA 1ST 15 MINUTES: Performed by: COLON & RECTAL SURGERY

## 2019-02-15 PROCEDURE — 45385 PR COLONOSCOPY,REMV LESN,SNARE: ICD-10-PCS | Mod: PT,,, | Performed by: COLON & RECTAL SURGERY

## 2019-02-15 PROCEDURE — 00811 ANES LWR INTST NDSC NOS: CPT | Performed by: COLON & RECTAL SURGERY

## 2019-02-15 PROCEDURE — 88305 TISSUE EXAM BY PATHOLOGIST: CPT | Mod: 26,,, | Performed by: PATHOLOGY

## 2019-02-15 PROCEDURE — 45385 COLONOSCOPY W/LESION REMOVAL: CPT | Performed by: COLON & RECTAL SURGERY

## 2019-02-15 PROCEDURE — 45385 COLONOSCOPY W/LESION REMOVAL: CPT | Mod: PT,,, | Performed by: COLON & RECTAL SURGERY

## 2019-02-15 PROCEDURE — 25000003 PHARM REV CODE 250: Performed by: COLON & RECTAL SURGERY

## 2019-02-15 PROCEDURE — 88305 TISSUE SPECIMEN TO PATHOLOGY - SURGERY: ICD-10-PCS | Mod: 26,,, | Performed by: PATHOLOGY

## 2019-02-15 PROCEDURE — 63600175 PHARM REV CODE 636 W HCPCS: Performed by: NURSE ANESTHETIST, CERTIFIED REGISTERED

## 2019-02-15 PROCEDURE — 45380 COLONOSCOPY AND BIOPSY: CPT | Mod: 59,,, | Performed by: COLON & RECTAL SURGERY

## 2019-02-15 PROCEDURE — 27201089 HC SNARE, DISP (ANY): Performed by: COLON & RECTAL SURGERY

## 2019-02-15 PROCEDURE — 25000003 PHARM REV CODE 250: Performed by: NURSE ANESTHETIST, CERTIFIED REGISTERED

## 2019-02-15 PROCEDURE — 27201012 HC FORCEPS, HOT/COLD, DISP: Performed by: COLON & RECTAL SURGERY

## 2019-02-15 PROCEDURE — 88305 TISSUE EXAM BY PATHOLOGIST: CPT | Performed by: PATHOLOGY

## 2019-02-15 RX ORDER — LIDOCAINE HYDROCHLORIDE 10 MG/ML
INJECTION INFILTRATION; PERINEURAL
Status: DISCONTINUED | OUTPATIENT
Start: 2019-02-15 | End: 2019-02-15

## 2019-02-15 RX ORDER — SODIUM CHLORIDE, SODIUM LACTATE, POTASSIUM CHLORIDE, CALCIUM CHLORIDE 600; 310; 30; 20 MG/100ML; MG/100ML; MG/100ML; MG/100ML
INJECTION, SOLUTION INTRAVENOUS CONTINUOUS
Status: ACTIVE | OUTPATIENT
Start: 2019-02-15

## 2019-02-15 RX ORDER — PROPOFOL 10 MG/ML
VIAL (ML) INTRAVENOUS
Status: DISCONTINUED | OUTPATIENT
Start: 2019-02-15 | End: 2019-02-15

## 2019-02-15 RX ADMIN — LIDOCAINE HYDROCHLORIDE 50 MG: 10 INJECTION, SOLUTION INFILTRATION; PERINEURAL at 12:02

## 2019-02-15 RX ADMIN — PROPOFOL 20 MG: 10 INJECTION, EMULSION INTRAVENOUS at 12:02

## 2019-02-15 RX ADMIN — PROPOFOL 20 MG: 10 INJECTION, EMULSION INTRAVENOUS at 01:02

## 2019-02-15 RX ADMIN — PROPOFOL 30 MG: 10 INJECTION, EMULSION INTRAVENOUS at 12:02

## 2019-02-15 RX ADMIN — PROPOFOL 80 MG: 10 INJECTION, EMULSION INTRAVENOUS at 12:02

## 2019-02-15 RX ADMIN — SODIUM CHLORIDE, SODIUM LACTATE, POTASSIUM CHLORIDE, AND CALCIUM CHLORIDE: .6; .31; .03; .02 INJECTION, SOLUTION INTRAVENOUS at 11:02

## 2019-02-15 NOTE — H&P
[facility]  Colon and Rectal Surgery  History & Physical    Patient Name: Yeimi Watson  MRN: [id number]  Admission Date: 2/15/2019  Attending Physician: Orville Stephens MD  Primary Care Provider: Rehan Izaguirre MD    Patient information was obtained from patient and medical records.    Subjective:     Chief Complaint/Reason for Admission: Here for Colonoscopy    History of Present Illness:  Patient is a 53 y.o. female presents for colonoscopy. She reports that her last colonoscopy was around 8 years ago the did show some benign polyps removed and was recommended to have another colonoscopy in 5 years but has not had that since that time.  She currently denies any anal or rectal pain, hematochezia, melena, fever, chills, diarrhea. Previously had some BRBPR that has now resolved with increased BMs and correction of constipation. No personal or fam hx of CRC or IBD.      No current facility-administered medications on file prior to encounter.      Current Outpatient Medications on File Prior to Encounter   Medication Sig    albuterol 90 mcg/actuation inhaler Inhale 2 puffs into the lungs every 6 (six) hours.    fluticasone (FLONASE) 50 mcg/actuation nasal spray 2 sprays (100 mcg total) by Each Nare route once daily.    fluticasone-salmeterol (ADVAIR HFA) 115-21 mcg/actuation HFAA Inhale 2 puffs into the lungs 2 (two) times daily. Controller       Review of patient's allergies indicates:   Allergen Reactions    Nsaids (non-steroidal anti-inflammatory drug)      Gastric sleeve no NSAIDS       Past Medical History:   Diagnosis Date    Asthma     Chest pain syndrome 3/11/2016    Emphysema     early     Past Surgical History:   Procedure Laterality Date    BREAST LUMPECTOMY Left     left breast, benign    GASTRIC BYPASS      HYSTERECTOMY      TVH for prolapse     Family History     Problem Relation (Age of Onset)    Heart attack Father (47), Sister (52)    Heart disease Father        Tobacco  Use    Smoking status: Former Smoker     Packs/day: 1.00     Years: 32.00     Pack years: 32.00     Last attempt to quit: 2016     Years since quittin.8   Substance and Sexual Activity    Alcohol use: No    Drug use: No    Sexual activity: Yes     Review of Systems   Constitutional: Negative for activity change, appetite change, chills, fatigue, fever and unexpected weight change.   HENT: Negative for congestion, ear pain, sore throat and trouble swallowing.    Eyes: Negative for pain, redness and itching.   Respiratory: Negative for cough, shortness of breath and wheezing.    Cardiovascular: Negative for chest pain, palpitations and leg swelling.   Gastrointestinal: Negative for abdominal distention, abdominal pain, anal bleeding, blood in stool, constipation, diarrhea, nausea, rectal pain and vomiting.   Endocrine: Negative for cold intolerance, heat intolerance and polyuria.   Genitourinary: Negative for dysuria, flank pain, frequency and hematuria.   Musculoskeletal: Negative for gait problem, joint swelling and neck pain.   Skin: Negative for color change, rash and wound.   Allergic/Immunologic: Negative for environmental allergies and immunocompromised state.   Neurological: Negative for dizziness, speech difficulty, weakness and numbness.   Psychiatric/Behavioral: Negative for agitation, confusion and hallucinations.     Objective:     Vital Signs (Most Recent):  Temp: 98.1 °F (36.7 °C) (02/15/19 1128)  Pulse: 63 (02/15/19 1128)  Resp: 17 (02/15/19 1128)  BP: 95/60 (02/15/19 1128)  SpO2: 99 % (02/15/19 1128) Vital Signs (24h Range):  Temp:  [98.1 °F (36.7 °C)] 98.1 °F (36.7 °C)  Pulse:  [63] 63  Resp:  [17] 17  SpO2:  [99 %] 99 %  BP: (95)/(60) 95/60     Weight: 64 kg (141 lb)  Body mass index is 24.2 kg/m².    Physical Exam   Constitutional: She is oriented to person, place, and time. She appears well-developed.   HENT:   Head: Normocephalic and atraumatic.   Eyes: Conjunctivae and EOM are  normal.   Neck: Normal range of motion. No thyromegaly present.   Cardiovascular: Normal rate and regular rhythm.   Pulmonary/Chest: Effort normal. No respiratory distress.   Abdominal: Soft. She exhibits no distension and no mass. There is no tenderness.   Musculoskeletal: Normal range of motion. She exhibits no edema or tenderness.   Neurological: She is alert and oriented to person, place, and time.   Skin: Skin is warm and dry. Capillary refill takes less than 2 seconds. No rash noted.   Psychiatric: She has a normal mood and affect.         Assessment/Plan:     Patient is a 53 y.o. female who presents for colonoscopy     - Ok to proceed to endoscopy suite for colonoscopy  - Consent obtained. All risks, benefits and alternatives fully explained to patient, including but not limited to bleeding, infection, perforation, and missed polyps. All questions appropriately answered to patient's satisfaction. Consent signed and placed on chart.    Active Diagnoses:    Diagnosis Date Noted POA    Screening for colon cancer [Z12.11] 02/15/2019 Not Applicable      Problems Resolved During this Admission:     VTE Risk Mitigation (From admission, onward)    None          Orville Stephens MD  Colon and Rectal Surgery  [facility]

## 2019-02-15 NOTE — DISCHARGE INSTRUCTIONS

## 2019-02-15 NOTE — OR NURSING
Pt adequately sedated.  Final time out done and agreed by all staff.  See ANESTHESIA records for all medications and vital signs.

## 2019-02-15 NOTE — BRIEF OP NOTE
[facility]   Brief Operative Note     SUMMARY     Surgery Date: 2/15/2019     Surgeon(s) and Role:     * Orville Stephens MD - Primary    Assisting Surgeon: None    Pre-op Diagnosis:  External hemorrhoids [K64.4]  Anal bleeding [K62.5]    Post-op Diagnosis:  Post-Op Diagnosis Codes:     * External hemorrhoids [K64.4]     * Anal bleeding [K62.5]    Procedure(s) (LRB):  COLONOSCOPY (N/A)    Anesthesia: Choice    Description of the findings of the procedure: See Op Note    Findings/Key Components: See Op Note    Estimated Blood Loss: * No values recorded between 2/15/2019 12:00 AM and 2/15/2019  1:43 PM *         Specimens:   Specimen (12h ago, onward)    None          Discharge Note    SUMMARY     Admit Date: 2/15/2019    Discharge Date and Time: 2/15/2019 1:43 PM    Hospital Course Patient was seen in the preoperative area by both myself and anesthesia. All consents were verified and all questions appropriately answered. All risks, benefits and alternatives explained to patient. Patient proceeded to endoscopy suite for colonoscopy and was discharged home postoperative once cleared by anesthesia.    Final Diagnosis: Post-Op Diagnosis Codes:     * External hemorrhoids [K64.4]     * Anal bleeding [K62.5]    Disposition: Home or Self Care    Follow Up/Patient Instructions: See Provation report    Medications:  Reconciled Home Medications:      Medication List      CONTINUE taking these medications    albuterol 90 mcg/actuation inhaler  Commonly known as:  PROVENTIL/VENTOLIN HFA  Inhale 2 puffs into the lungs every 6 (six) hours.     fluticasone 50 mcg/actuation nasal spray  Commonly known as:  FLONASE  2 sprays (100 mcg total) by Each Nare route once daily.     fluticasone-salmeterol 115-21 mcg/actuation Hfaa  Commonly known as:  ADVAIR HFA  Inhale 2 puffs into the lungs 2 (two) times daily. Controller        STOP taking these medications    sodium,potassium,mag sulfates 17.5-3.13-1.6 gram  Solr  Commonly known as:  SUPREP BOWEL PREP KIT          Discharge Procedure Orders   Diet general     Call MD for:  temperature >100.4     Call MD for:  persistent nausea and vomiting     Call MD for:  severe uncontrolled pain     Call MD for:  difficulty breathing, headache or visual disturbances     Call MD for:  redness, tenderness, or signs of infection (pain, swelling, redness, odor or green/yellow discharge around incision site)     Call MD for:  hives     Call MD for:  persistent dizziness or light-headedness     Call MD for:  extreme fatigue     Activity as tolerated     Follow-up Information     Rehan Izaguirre MD.    Specialty:  Family Medicine  Why:  As needed  Contact information:  [address]  [facility]  [facility] [address] [phone]

## 2019-02-15 NOTE — [employer] PATIENT INSTRUCTIONS
Discharge Summary/Instructions after an Endoscopic Procedure  Patient Name: Yeimi Watson  Patient MRN: [id number]  Patient YOB: 1965  Friday, February 15, 2019 Orville Stephens MD  RESTRICTIONS:  During your procedure today, you received medications for sedation.  These   medications may affect your judgment, balance and coordination.  Therefore,   for 24 hours, you have the following restrictions:   - DO NOT drive a car, operate machinery, make legal/financial decisions,   sign important papers or drink alcohol.    ACTIVITY:  Today: no heavy lifting, straining or running due to procedural   sedation/anesthesia.  The following day: return to full activity including work.  DIET:  Eat and drink normally unless instructed otherwise.     TREATMENT FOR COMMON SIDE EFFECTS:  - Mild abdominal pain, nausea, belching, bloating or excessive gas:  rest,   eat lightly and use a heating pad.  - Sore Throat: treat with throat lozenges and/or gargle with warm salt   water.  - Because air was used during the procedure, expelling large amounts of air   from your rectum or belching is normal.  - If a bowel prep was taken, you may not have a bowel movement for 1-3 days.    This is normal.  SYMPTOMS TO WATCH FOR AND REPORT TO YOUR PHYSICIAN:  1. Abdominal pain or bloating, other than gas cramps.  2. Chest pain.  3. Back pain.  4. Signs of infection such as: chills or fever occurring within 24 hours   after the procedure.  5. Rectal bleeding, which would show as bright red, maroon, or black stools.   (A tablespoon of blood from the rectum is not serious, especially if   hemorrhoids are present.)  6. Vomiting.  7. Weakness or dizziness.  GO DIRECTLY TO THE NEAREST EMERGENCY ROOM IF YOU HAVE ANY OF THE FOLLOWING:      Difficulty breathing              Chills and/or fever over 101 F   Persistent vomiting and/or vomiting blood   Severe abdominal pain   Severe chest pain   Black, tarry stools   Bleeding- more than one  tablespoon   Any other symptom or condition that you feel may need urgent attention  Your doctor recommends these additional instructions:  If any biopsies were taken, your doctors clinic will contact you in 1 to 2   weeks with any results.  - Discharge patient to home.   - Resume previous diet.   - Continue present medications.   - Await pathology results.   - Repeat colonoscopy date to be determined after pending pathology results   are reviewed for surveillance based on pathology results and for   surveillance of multiple polyps.   - Return to primary care physician as previously scheduled.  For questions, problems or results please call your physician Orville Stephens MD at Work:  [phone]  If you have any questions about the above instructions, call the GI   department at [phone] or call the endoscopy unit at [phone]   from 7am until 3 pm.  [facility], EMERGENCY ROOM PHONE NUMBER:   [phone]  IF A COMPLICATION OR EMERGENCY SITUATION ARISES AND YOU ARE UNABLE TO REACH   YOUR PHYSICIAN - GO DIRECTLY TO THE EMERGENCY ROOM.  I have read or have had read to me these discharge instructions for my   procedure and have received a written copy.  I understand these   instructions and will follow-up with my physician if I have any questions.     __________________________________       _____________________________________  Nurse Signature                                          Patient/Designated   Responsible Party Signature  MD Orville Lopez MD  2/15/2019 2:14:28 PM  This report has been verified and signed electronically.  [employer]

## 2019-02-15 NOTE — ANESTHESIA RELEASE NOTE
"Anesthesia Release from PACU Note    Patient: Yeimi Watson    Procedure(s) Performed: Procedure(s) (LRB):  COLONOSCOPY (N/A)    Anesthesia type: MAC    Post pain: Adequate analgesia    Post assessment: no apparent anesthetic complications, tolerated procedure well and no evidence of recall    Last Vitals:   Visit Vitals  BP 95/60 (BP Location: Left arm, Patient Position: Lying)   Pulse 63   Temp 36.7 °C (98.1 °F) (Temporal)   Resp 17   Ht 5' 4" (1.626 m)   Wt 64 kg (141 lb)   SpO2 99%   Breastfeeding? No   BMI 24.20 kg/m²       Post vital signs: stable    Level of consciousness: awake    Nausea/Vomiting: no nausea/no vomiting    Complications: none    Airway Patency: patent    Respiratory: unassisted, spontaneous ventilation, room air    Cardiovascular: stable and blood pressure at baseline    Hydration: euvolemic  "

## 2019-02-15 NOTE — ANESTHESIA POSTPROCEDURE EVALUATION
"Anesthesia Post Evaluation    Patient: Yeimi Watson    Procedure(s) Performed: Procedure(s) (LRB):  COLONOSCOPY (N/A)    Final Anesthesia Type: MAC  Patient location during evaluation: PACU  Patient participation: Yes- Able to Participate  Level of consciousness: awake  Post-procedure vital signs: reviewed and stable  Pain management: adequate  Airway patency: patent  PONV status at discharge: No PONV  Anesthetic complications: no      Cardiovascular status: blood pressure returned to baseline and hemodynamically stable  Respiratory status: unassisted, room air and spontaneous ventilation  Hydration status: euvolemic  Follow-up not needed.        Visit Vitals  BP 95/60 (BP Location: Left arm, Patient Position: Lying)   Pulse 63   Temp 36.7 °C (98.1 °F) (Temporal)   Resp 17   Ht 5' 4" (1.626 m)   Wt 64 kg (141 lb)   SpO2 99%   Breastfeeding? No   BMI 24.20 kg/m²       Pain/Saima Score: No Data Recorded      "

## 2019-02-15 NOTE — ANESTHESIA PREPROCEDURE EVALUATION
02/15/2019  Yeimi Watson is a 53 y.o., female.    Anesthesia Evaluation    I have reviewed the Patient Summary Reports.    I have reviewed the Nursing Notes.   I have reviewed the Medications.     Review of Systems  Anesthesia Hx:  No problems with previous Anesthesia  Denies Family Hx of Anesthesia complications.   Denies Personal Hx of Anesthesia complications.   Social:  Former Smoker, No Alcohol Use    Hematology/Oncology:  Hematology Normal   Oncology Normal     Cardiovascular:   Denies Hypertension.  Denies MI.   Denies CABG/stent.      ECG has been reviewed. ekg 3/2016:  Sinus bradycardia 58  Nonspecific ST abnormality  Abnormal ECG  When compared with ECG of 08-MAR-2016 16:24,  No significant change was found    Chest pain syndrome   Pulmonary:   COPD Denies Asthma.  Denies Sleep Apnea.    Renal/:  Renal/ Normal     Hepatic/GI:   Bowel Prep. Denies GERD. Denies Liver Disease.  Denies Hepatitis.    Musculoskeletal:  Musculoskeletal Normal    Neurological:   Denies CVA. Denies Seizures.    Endocrine:  Endocrine Normal        Physical Exam  General:  Well nourished    Airway/Jaw/Neck:  Airway Findings: Mouth Opening: Normal Tongue: Normal  General Airway Assessment: Adult  Mallampati: II      Dental:  Dental Findings: In tact   Chest/Lungs:  Chest/Lungs Findings: Clear to auscultation, Normal Respiratory Rate     Heart/Vascular:  Heart Findings: Rate: Normal  Rhythm: Regular Rhythm  Sounds: Normal             Anesthesia Plan  Type of Anesthesia, risks & benefits discussed:  Anesthesia Type:  MAC  Patient's Preference:   Intra-op Monitoring Plan: standard ASA monitors  Intra-op Monitoring Plan Comments:   Post Op Pain Control Plan:   Post Op Pain Control Plan Comments:   Induction:   IV  Beta Blocker:  Patient is not currently on a Beta-Blocker (No further documentation required).       Informed  Consent: Patient understands risks and agrees with Anesthesia plan.  Questions answered. Anesthesia consent signed with patient.  ASA Score: 2     Day of Surgery Review of History & Physical: I have interviewed and examined the patient. I have reviewed the patient's H&P dated:  There are no significant changes.  H&P update referred to the surgeon.         Ready For Surgery From Anesthesia Perspective.

## 2019-02-15 NOTE — TRANSFER OF CARE
"Anesthesia Transfer of Care Note    Patient: Yeimi Watson    Procedure(s) Performed: Procedure(s) (LRB):  COLONOSCOPY (N/A)    Patient location: PACU    Anesthesia Type: MAC    Transport from OR: Transported from OR on room air with adequate spontaneous ventilation    Post pain: adequate analgesia    Post assessment: no apparent anesthetic complications and tolerated procedure well    Post vital signs: stable    Level of consciousness: awake    Nausea/Vomiting: no nausea/vomiting    Complications: none    Transfer of care protocol was followed      Last vitals:   Visit Vitals  BP 95/60 (BP Location: Left arm, Patient Position: Lying)   Pulse 63   Temp 36.7 °C (98.1 °F) (Temporal)   Resp 17   Ht 5' 4" (1.626 m)   Wt 64 kg (141 lb)   SpO2 99%   Breastfeeding? No   BMI 24.20 kg/m²     "

## 2021-04-28 ENCOUNTER — PATIENT MESSAGE (OUTPATIENT)
Dept: RESEARCH | Facility: HOSPITAL | Age: 56
End: 2021-04-28

## 2023-10-27 ENCOUNTER — HOSPITAL ENCOUNTER (OUTPATIENT)
Dept: RADIOLOGY | Facility: HOSPITAL | Age: 58
Discharge: HOME | End: 2023-10-27
Attending: NURSE PRACTITIONER
Payer: OTHER GOVERNMENT

## 2023-10-27 VITALS — BODY MASS INDEX: 24.1 KG/M2 | WEIGHT: 141.13 LBS | HEIGHT: 64 IN

## 2023-10-27 DIAGNOSIS — Z12.31 ENCOUNTER FOR SCREENING MAMMOGRAM FOR MALIGNANT NEOPLASM OF BREAST: ICD-10-CM

## 2023-10-27 PROCEDURE — 77067 SCR MAMMO BI INCL CAD: CPT | Mod: TC

## 2023-10-27 PROCEDURE — 77067 SCR MAMMO BI INCL CAD: CPT | Mod: 26,,, | Performed by: RADIOLOGY

## 2023-10-27 PROCEDURE — 77063 MAMMO DIGITAL SCREENING BILAT WITH TOMO: ICD-10-PCS | Mod: 26,,, | Performed by: RADIOLOGY

## 2023-10-27 PROCEDURE — 77063 BREAST TOMOSYNTHESIS BI: CPT | Mod: 26,,, | Performed by: RADIOLOGY

## 2023-10-27 PROCEDURE — 77067 MAMMO DIGITAL SCREENING BILAT WITH TOMO: ICD-10-PCS | Mod: 26,,, | Performed by: RADIOLOGY

## 2023-11-07 ENCOUNTER — TELEPHONE (OUTPATIENT)
Dept: RADIOLOGY | Facility: HOSPITAL | Age: 58
End: 2023-11-07
Payer: OTHER GOVERNMENT

## 2023-11-15 ENCOUNTER — HOSPITAL ENCOUNTER (OUTPATIENT)
Dept: RADIOLOGY | Facility: HOSPITAL | Age: 58
Discharge: HOME | End: 2023-11-15
Attending: NURSE PRACTITIONER
Payer: OTHER GOVERNMENT

## 2023-11-15 DIAGNOSIS — R92.8 ABNORMAL MAMMOGRAM: ICD-10-CM

## 2023-11-15 PROCEDURE — 77061 BREAST TOMOSYNTHESIS UNI: CPT | Mod: 26,LT,, | Performed by: RADIOLOGY

## 2023-11-15 PROCEDURE — 77065 DX MAMMO INCL CAD UNI: CPT | Mod: TC,LT

## 2023-11-15 PROCEDURE — 77065 DX MAMMO INCL CAD UNI: CPT | Mod: 26,LT,, | Performed by: RADIOLOGY

## 2023-11-15 PROCEDURE — 76642 ULTRASOUND BREAST LIMITED: CPT | Mod: TC,LT

## 2023-11-15 PROCEDURE — 77061 MAMMO DIGITAL DIAGNOSTIC LEFT WITH TOMO: ICD-10-PCS | Mod: 26,LT,, | Performed by: RADIOLOGY

## 2023-11-15 PROCEDURE — 76642 US BREAST LEFT LIMITED: ICD-10-PCS | Mod: 26,LT,, | Performed by: RADIOLOGY

## 2023-11-15 PROCEDURE — 77065 MAMMO DIGITAL DIAGNOSTIC LEFT WITH TOMO: ICD-10-PCS | Mod: 26,LT,, | Performed by: RADIOLOGY

## 2023-11-15 PROCEDURE — 76642 ULTRASOUND BREAST LIMITED: CPT | Mod: 26,LT,, | Performed by: RADIOLOGY

## 2024-01-19 ENCOUNTER — HOSPITAL ENCOUNTER (OUTPATIENT)
Dept: RADIOLOGY | Facility: HOSPITAL | Age: 59
Discharge: HOME | End: 2024-01-19
Attending: STUDENT IN AN ORGANIZED HEALTH CARE EDUCATION/TRAINING PROGRAM
Payer: OTHER GOVERNMENT

## 2024-01-19 ENCOUNTER — OFFICE VISIT (OUTPATIENT)
Dept: RHEUMATOLOGY | Facility: CLINIC | Age: 59
End: 2024-01-19
Payer: OTHER GOVERNMENT

## 2024-01-19 VITALS
HEIGHT: 64 IN | DIASTOLIC BLOOD PRESSURE: 71 MMHG | HEART RATE: 57 BPM | WEIGHT: 137.13 LBS | BODY MASS INDEX: 23.41 KG/M2 | SYSTOLIC BLOOD PRESSURE: 103 MMHG

## 2024-01-19 DIAGNOSIS — M77.01 MEDIAL EPICONDYLITIS OF BOTH ELBOWS: ICD-10-CM

## 2024-01-19 DIAGNOSIS — M25.50 ARTHRALGIA, UNSPECIFIED JOINT: ICD-10-CM

## 2024-01-19 DIAGNOSIS — M77.02 MEDIAL EPICONDYLITIS OF BOTH ELBOWS: ICD-10-CM

## 2024-01-19 DIAGNOSIS — M70.62 TROCHANTERIC BURSITIS OF BOTH HIPS: ICD-10-CM

## 2024-01-19 DIAGNOSIS — M15.9 PRIMARY OSTEOARTHRITIS INVOLVING MULTIPLE JOINTS: ICD-10-CM

## 2024-01-19 DIAGNOSIS — M25.50 ARTHRALGIA, UNSPECIFIED JOINT: Primary | ICD-10-CM

## 2024-01-19 DIAGNOSIS — M70.61 TROCHANTERIC BURSITIS OF BOTH HIPS: ICD-10-CM

## 2024-01-19 PROCEDURE — 73521 X-RAY EXAM HIPS BI 2 VIEWS: CPT | Mod: 26,,, | Performed by: RADIOLOGY

## 2024-01-19 PROCEDURE — 73521 X-RAY EXAM HIPS BI 2 VIEWS: CPT | Mod: TC

## 2024-01-19 PROCEDURE — 99214 OFFICE O/P EST MOD 30 MIN: CPT | Mod: PBBFAC | Performed by: STUDENT IN AN ORGANIZED HEALTH CARE EDUCATION/TRAINING PROGRAM

## 2024-01-19 PROCEDURE — 73630 X-RAY EXAM OF FOOT: CPT | Mod: 26,50,, | Performed by: RADIOLOGY

## 2024-01-19 PROCEDURE — 73562 X-RAY EXAM OF KNEE 3: CPT | Mod: 26,50,, | Performed by: RADIOLOGY

## 2024-01-19 PROCEDURE — 99204 OFFICE O/P NEW MOD 45 MIN: CPT | Mod: S$PBB,,, | Performed by: STUDENT IN AN ORGANIZED HEALTH CARE EDUCATION/TRAINING PROGRAM

## 2024-01-19 PROCEDURE — 99999 PR PBB SHADOW E&M-EST. PATIENT-LVL IV: CPT | Mod: PBBFAC,,, | Performed by: STUDENT IN AN ORGANIZED HEALTH CARE EDUCATION/TRAINING PROGRAM

## 2024-01-19 PROCEDURE — 73562 X-RAY EXAM OF KNEE 3: CPT | Mod: TC,50

## 2024-01-19 PROCEDURE — 73630 X-RAY EXAM OF FOOT: CPT | Mod: TC,50

## 2024-01-19 RX ORDER — CLONAZEPAM 1 MG/1
1 TABLET ORAL 2 TIMES DAILY PRN
COMMUNITY
Start: 2023-12-15

## 2024-01-19 RX ORDER — BUPROPION HYDROCHLORIDE 75 MG/1
75 TABLET ORAL 2 TIMES DAILY
COMMUNITY

## 2024-01-19 RX ORDER — DEXTROMETHORPHAN HYDROBROMIDE, GUAIFENESIN 5; 100 MG/5ML; MG/5ML
650 LIQUID ORAL EVERY 8 HOURS
Qty: 90 TABLET | Refills: 3 | Status: SHIPPED | OUTPATIENT
Start: 2024-01-19

## 2024-01-19 RX ORDER — DICLOFENAC SODIUM 10 MG/G
2 GEL TOPICAL 4 TIMES DAILY
Qty: 50 G | Refills: 3 | Status: SHIPPED | OUTPATIENT
Start: 2024-01-19

## 2024-01-19 RX ORDER — CITALOPRAM 10 MG/1
TABLET ORAL
COMMUNITY
Start: 2023-12-15

## 2024-01-19 RX ORDER — LINACLOTIDE 145 UG/1
145 CAPSULE, GELATIN COATED ORAL
COMMUNITY
Start: 2023-12-15

## 2024-01-19 NOTE — PROGRESS NOTES
Subjective:      Patient ID: Yeimi Watson is a 58 y.o. female.    Chief Complaint: joint pain    HPI:   Patient with anxiety, depression, COPD, former smoker, hx gastric bypass, presents for Rheumatology evaluation of joint pain. She endorses pain in the right low back, bilateral hips in the groin and laterally, knees, ankles, feet. Pain is worse with activity. She took down her [employer] decorations and was in pain for 2 days. She gets stiffness for a few minutes in the morning. Some days she's in pain or stiff all day. She denies joint swelling. She gets leg swelling sometimes which is worse since having varicose vein procedure on the right leg. She quit smoking 8 years ago. She had gastric bypass surgery 5 years ago and lost weight. Prior to the weight loss surgery, she was snoring and her  told her she had apnea while sleeping. However she has not had any issues with sleep since the weight loss. COPD also has not bothered her since the weight loss. She admits to not getting any exercise and having poor posture. She endorses anxiety and depression since her son passed away 2 years ago. He had multiple sclerosis. She was seeing a counselor for this but she is trying to find a new one since that person was her son's counselor. Her oldest daughter has EDS and POTS and a lot of GI issues with stomach ulcers and needed bowel surgery at some point for strangulation involving the duodenum. Her mother had osteoarthritis.     No skin rashes, malar rash, photosensitivity   No telangiectasias   No calcinosis   No psoriasis   No patchy alopecia   No oral or nasal ulcers   No dry eyes or dry mouth   No pleurisy, chest pain, dyspnea, cough  No dysphagia, diplopia, dysphonia  No muscle weakness   No nausea, vomiting, diarrhea, constipation   No acid reflux  No Raynaud's  No digital ulcers   No cytopenias   No renal issues   No blood clots   No fever, chills, night sweats, weight loss, or loss of appetite   No new  "onset headaches   No recurrent conjunctivitis, uveitis, scleritis, or episcleritis   No chronic or bloody diarrhea. No Ulcerative Colitis or Crohn's (IBD)  No vaginal or penile and urethral discharge/STDs/ulcers   No unexplained lymphadenopathy  No parotitis   No seizures, strokes, psychosis  No sclerodactyly  No puffy hands  No perioral tightness     Social Hx: Former smoker, quit 8 years ago.  Family Hx: No family history of autoimmune disease      Objective:   /71   Pulse (!) 57   Ht 5' 4" (1.626 m)   Wt 62.2 kg (137 lb 2 oz)   BMI 23.54 kg/m²   Physical Exam  Constitutional:       General: She is not in acute distress.     Appearance: Normal appearance.   HENT:      Head: Normocephalic and atraumatic.      Mouth/Throat:      Mouth: Mucous membranes are moist.      Pharynx: Oropharynx is clear.   Cardiovascular:      Rate and Rhythm: Normal rate and regular rhythm.   Pulmonary:      Effort: Pulmonary effort is normal.      Breath sounds: Normal breath sounds.   Abdominal:      Palpations: Abdomen is soft.      Tenderness: There is no abdominal tenderness.   Musculoskeletal:         General: No swelling or tenderness.      Cervical back: Normal range of motion. No tenderness.      Comments: No synovitis or effusions. She sits with a slouch. Tenderness paraspinal muscles of the right low back. Low back ROM is intact. Tenderness of the hips in the groin. Jaswinder test negative. Bilateral greater trochanter pain. Bilateral medial epicondylitis in the elbows. Ankles tender. MTP squeeze test tender bilaterally.   Skin:     General: Skin is warm and dry.   Neurological:      Mental Status: She is alert and oriented to person, place, and time. Mental status is at baseline.           Assessment:     1. Arthralgia, unspecified joint    2. Primary osteoarthritis involving multiple joints    3. Trochanteric bursitis of both hips    4. Medial epicondylitis of both elbows          Plan:     Problem List Items Addressed " This Visit    None  Visit Diagnoses       Arthralgia, unspecified joint    -  Primary    Relevant Orders    X-Ray Hips Bilateral 2 View Inc AP Pelvis    X-Ray Knee 3 View Bilateral    X-Ray Foot Complete Bilateral    CBC Auto Differential    Comprehensive Metabolic Panel    C-Reactive Protein    Sedimentation rate    Primary osteoarthritis involving multiple joints        Trochanteric bursitis of both hips        Medial epicondylitis of both elbows                Patient with anxiety, depression, COPD, former smoker, hx gastric bypass, presents for Rheumatology evaluation of joint pain.    She does not have inflammatory symptoms and she has no synovitis on exam. She has pain in the hands in an osteoarthritis distrubution. She has features of fibromyalgia such as widespread pain and easily getting fatigued. The weight loss has helped with sleep apnea symptoms. The anxiety and depression are probably making symptoms worse. The lack of aerobic exercise and muscle conditioning is making osteoarthritis and fibromyalgia symptoms worse. Will investigate for inflammation and osteoarthritis. Offered Sports Medicine referral for injections in the hip and trochanteric bursa but she declined.      Plan:  Labs today: CBC, CMP, ESR, CRP  XR hips, knees, feet  Take Arthritis strength extended release Tylenol 650 mg 1 tablet 3 times daily as needed for pain.  Voltaren gel four times daily. She can't take oral NSAIDs due to history of gastric bypass.  Ice 15 minutes twice a day, rest, elevation for the hip bursitis and elbow tendinitis.  Start turmeric supplements 1000 mg 2 times daily for anti-inflammatory benefit.  Try high fiber whole food plant based diet with fewer animal products and sugars.  Education provided: Daily aerobic exercise, Low impact weight bearing exercise and muscle conditioning. Mediterranean diet. Mindfulness meditation. Yoga and Paul Chi. Try the [employer] tulio. Visit [employer] for more resources.  Follow up  if she develops inflammatory symptoms or if the workup is concerning for inflammatory arthritis.        I spent a total of 45 minutes on the day of the visit.  This includes face to face time and non-face to face time preparing to see the patient (eg, review of tests), obtaining and/or reviewing separately obtained history, documenting clinical information in the electronic or other health record, independently interpreting results and communicating results to the patient/family/caregiver, or care coordinator.

## 2025-03-13 ENCOUNTER — PATIENT MESSAGE (OUTPATIENT)
Dept: RHEUMATOLOGY | Facility: CLINIC | Age: 60
End: 2025-03-13
Payer: COMMERCIAL